# Patient Record
Sex: FEMALE | Race: WHITE | NOT HISPANIC OR LATINO | Employment: OTHER | URBAN - METROPOLITAN AREA
[De-identification: names, ages, dates, MRNs, and addresses within clinical notes are randomized per-mention and may not be internally consistent; named-entity substitution may affect disease eponyms.]

---

## 2017-01-09 ENCOUNTER — OFFICE VISIT (OUTPATIENT)
Dept: AUDIOLOGY | Facility: CLINIC | Age: 67
End: 2017-01-09

## 2017-01-09 DIAGNOSIS — H90.3 SENSORY HEARING LOSS, BILATERAL: Primary | ICD-10-CM

## 2017-01-09 NOTE — PROGRESS NOTES
AUDIOLOGY REPORT    BACKGROUND INFORMATION: Dr. Heena Hurst implanted Buffy Pruitt with a right  Nucleus  (serial #[id number]) cochlear implant (CI) on 10/5/16 (activation 11/1/16) due to normal hearing sloping to moderate to profound sensorineural hearing loss in the left ear and normal hearing sloping to moderately severe to profound sensorineural hearing loss in the right ear.  The patient is being seen for reprogramming of her right cochlear implant on 1/9/2017 in Audiology at the [facility].      PATIENT REPORT: Joanna has been using her right cochlear implant for about 2 months.  Overall, she had been doing well since the last visit until about 2 days ago when things seemed to sound more intense/tinny with the CI.  Patient also reports a constant sense of a low level noise with her CI on.  No health changes.  She takes only vitamins and began taking fish oil the day before onset.  She denied other changes.  She arrives wearing the SCAN program at the [facility].  She has been plugging the left ear and leaving the hearing aid out.  Patient plans to start using [employer] and Telephone with Confidence for listening practice.     FITTING SESSION: Dr. Heena Hurst, cochlear implant surgeon, ordered today's appointment. The patient came to the clinic for adjustment to the programs in the external speech processor and for assessment of the external components of the cochlear implant system. These components provide power and data to the internal device. Sound is only heard once the external portion is activated. Postoperative treatment, including device fitting and adjustment, audiologic assessments, and training are required at regular intervals. Testing can include electropsychophysical measures of threshold, comfort, and loudness balancing, which are completed to update the program.    Processor type: Two N6   Headpiece type: 900 series  Magnet  strength: #1 without irritation of skin.          TEST RESULTS:   Unaided hearing: Did not test today.  11/1/16 results: Left stable - normal hearing sloping to moderate to profound sensorineural hearing loss, Right - moderate to profound sensorineural hearing loss (stable except for 25-45 dB decrease from 250-750 Hz as expected following cochlear implantation).  Negative Imelda.  Patient was advised that she would be a candidate for using an acoustic component at 250 Hz and below.  Patient gave this much thought but declined trying it.  She reported she would like to get used to a fully electrical program in case hearing should continue to decline in the future.  She indicated she would not want to have to readjust to a change later.  Additionally, she does not want to have to manage the acoustic component or wear something in her ear.  She was happy with the sound quality in the electric only condition so declined even trying electric + acoustic.  I had ordered the acoustic component so I will hold it in the clinic in case she later decides she would like to try it.     Tympanograms: Did not test  Electrode Impedances: within normal limits.  Stable from last visit when electrodes 3-1 increased slightly.  They have not returned to baseline.  Will monitor.      Neural Response Testing: Did not test today since clear responses were found across the array at past visit.  Facial Stimulation: Absent  Tinnitus: Absent  Balance Problems: Absent  Pain/Discomfort: None reported  Strategies Tried:  Hz  Strategy Preference:  Hz    Programs:  1. (17) HOME: Measured comfort levels, ADRO + autosensitivity  2. (18) HOME: Comfort levels up 5 from program 1  3. (17) SCAN based on program #1  4. (17) CAFE with fixed directional, ADRO + autosensitivity, based on program #1    Number of Channels per Program: 22    COMMENTS: The left ear was plugged during programming of the right CI.  Patient was able to consistently  measure threshold (T) levels and scale loudness for comfort (C) levels with the right CI.  Reinstruction was provided and T levels decreased significantly.  C levels were fairly stable in the low to middle frequencies.  They decreased in the high frequencies.  Upon going live, the patient needed an overall increase of C levels of 2 units to achieve comfort.  She reported the quality significantly improved after the changes and the low level noise was no longer present.      SUMMARY AND RECOMMENDATIONS: Buffy Pruitt was seen for reprogramming of her right Cochlear [employer]  cochlear implant with N6 sound processors today.  T levels and high frequency C levels decreased today which resulted in improved quality.  Patient reports a sudden change in sound 2 days ago without a change in impedances so we will monitor. She will contact the clinic if there are any additional sudden changes before her 1/30/17 visit.  That day, we will complete her 3 month speech perception testing, impedance monitoring, programming if needed, tympanograms and unaided hearing testing.   She will notify the clinic if she would like to try the acoustic component on her processor in the future.  If things are going well based on the 3 month visit, we will consider resuming use of the left hearing aid after the next visit.  The patient will also contact the clinic with questions prior to the next visit.    The patient expressed understanding and agreement with this plan.      Miki Ly, CCC-A  Licensed Audiologist  [place] #[id number]

## 2017-01-30 ENCOUNTER — OFFICE VISIT (OUTPATIENT)
Dept: AUDIOLOGY | Facility: CLINIC | Age: 67
End: 2017-01-30

## 2017-01-30 ENCOUNTER — OFFICE VISIT (OUTPATIENT)
Dept: DERMATOLOGY | Facility: CLINIC | Age: 67
End: 2017-01-30

## 2017-01-30 DIAGNOSIS — L82.1 SEBORRHEIC KERATOSIS: ICD-10-CM

## 2017-01-30 DIAGNOSIS — H90.3 SENSORY HEARING LOSS, BILATERAL: Primary | ICD-10-CM

## 2017-01-30 DIAGNOSIS — D22.9 MULTIPLE BENIGN NEVI: ICD-10-CM

## 2017-01-30 DIAGNOSIS — Z12.83 SKIN CANCER SCREENING: Primary | ICD-10-CM

## 2017-01-30 RX ORDER — FEXOFENADINE HCL 180 MG/1
TABLET ORAL DAILY
COMMUNITY
Start: 2016-08-01

## 2017-01-30 ASSESSMENT — PAIN SCALES - GENERAL: PAINLEVEL: NO PAIN (0)

## 2017-01-30 NOTE — MR AVS SNAPSHOT
After Visit Summary   1/30/2017    Buffy Pruitt    MRN: [id number]           Patient Information     Date Of Birth          1950        Visit Information        Provider Department      1/30/2017 10:30 AM Carrie Vila AUD [facility] Audiology         Follow-ups after your visit        Your next 10 appointments already scheduled     Jan 30, 2017 12:30 PM   (Arrive by 12:15 PM)   New Patient Visit with TORY [facility] ([facility])    [address] [place] [id number] [phone]            Apr 06, 2017  9:00 AM   Cochlear Implant Return with FIDEL [facility] ([facility])    [address] [place] [id number] [phone]            May 01, 2017 10:30 AM   Cochlear Implant Return with FIDEL [facility]iology ([facility])    [address] [place] [id number] [phone]              Who to contact     Please call your clinic at [phone] to:    Ask questions about your health    Make or cancel appointments    Discuss your medicines    Learn about your test results    Speak to your doctor   If you have compliments or concerns about an experience at your clinic, or if you wish to file a complaint, please contact [facility] Patient Relations at [phone] or email us at [email or web address]         Additional Information About Your Visit        Orthoshart Information     [employer] gives you secure access to your electronic health record. If you see a primary care provider, you can also send messages to your care team and make appointments. If you have questions, please call your primary care clinic.  If you do not have a primary care provider, please call [phone] and they will assist you.      NanoICE is an electronic gateway  that provides easy, online access to your medical records. With Kran[employer], you can request a clinic appointment, read your test results, renew a prescription or communicate with your care team.     To access your existing account, please contact your [facility] or call [phone] for assistance.        Care EveryWhere ID     This is your Care EveryWhere ID. This could be used by other organizations to access your [place] medical records  [id number]         Blood Pressure from Last 3 Encounters:   10/05/16 100/59   05/21/14 101/64    Weight from Last 3 Encounters:   10/05/16 53.524 kg (118 lb)   07/19/16 51.71 kg (114 lb)   05/21/14 52.617 kg (116 lb)              Today, you had the following     No orders found for display       Primary Care Provider Office Phone # Fax #    Shakira Hess [phone]       [facility] [address] [facility] [address]        Thank you!     Thank you for choosing [facility] AUDIOLOGY  for your care. Our goal is always to provide you with excellent care. Hearing back from our patients is one way we can continue to improve our services. Please take a few minutes to complete the written survey that you may receive in the mail after your visit with us. Thank you!             Your Updated Medication List - Protect others around you: Learn how to safely use, store and throw away your medicines at www.disposemymeds.org.          This list is accurate as of: 1/30/17 11:52 AM.  Always use your most recent med list.                   Brand Name Dispense Instructions for use    calcium 600 MG tablet     180 tablet    Take 1 tablet by mouth 2 times daily       cefUROXime 250 MG tablet    CEFTIN    14 tablet    Take 1 tablet (250 mg) by mouth 2 times daily       HYDROcodone-acetaminophen 5-325 MG per tablet    NORCO    30 tablet    Take 1-2 tablets by mouth every 4 hours as needed for other (Moderate to Severe Pain)       magnesium 250 MG  tablet     30 tablet    Take 2 tablets by mouth daily       sertraline 50 MG tablet    ZOLOFT    90 tablet    Take 1 tablet (50 mg) by mouth daily       Vit C-Cholecalciferol-Oralia Hip 500-1000-20 MG-UNIT-MG Caps          VIT D-VIT E-SAFFLOWER OIL EX

## 2017-01-30 NOTE — NURSING NOTE
Dermatology Rooming Note    Buffy Pruitt's goals for this visit include:   Chief Complaint   Patient presents with     Skin Check     No spots of concern today. No personal or family hx of skin cancer.     Hayley Juarez, CMA
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
## 2017-01-30 NOTE — PROGRESS NOTES
"AUDIOLOGY REPORT    METHOD: Speech perception testing is conducted at regular intervals to determine the degree of benefit the patient is obtaining from the cochlear implant (CI). Tests are conducted using the cochlear implant without the benefit of lipreading. All tests are conducted in a sound-treated room. Perception of monosyllabic words and words in sentences are tested. Results usually show improvement over time. A decrease in performance indicates the need for re-programming of the prosthesis and/or replacement of components.     INTERVAL: 3 months     BACKGROUND: Buffy Pruitt was seen 1/30/2017 in Audiology at the [facility] [place] for right cochlear implant programming and 3 month testing.  Dr. Heena Hurst implanted Buffy Pruitt with a right  Nucleus  (serial #[id number]) cochlear implant (CI) on 10/5/16 (activation 11/1/16) due to normal hearing sloping to moderate to profound sensorineural hearing loss in the left ear and normal hearing sloping to moderately severe to profound sensorineural hearing loss in the right ear.      PATIENT REPORT: Joanna has been increasing her right cochlear implant volume to 9 or 10 so would like programming changes before today's 3 month testing.  She hears a \"whisper\" sound at the ends of high pitched speech sounds.  Patient reports she has not had any sudden changes in hearing with her CI since the one incident prior to the last clinic visit.  Patient has still been wearing the right CI alone and has not needed her left hearing aid.  She has been plugging the left ear.      FITTING SESSION: Dr. Heena Hurst, cochlear implant surgeon, ordered today's appointment. The patient came to the clinic for adjustment to the programs in the external speech processor and for assessment of the external components of the cochlear implant system. These components provide power and data to the internal device. Sound is only " heard once the external portion is activated. Postoperative treatment, including device fitting and adjustment, audiologic assessments, and training are required at regular intervals. Testing can include electropsychophysical measures of threshold, comfort, and loudness balancing, which are completed to update the program.    Processor type: Two N6 - microphone filters were changed prior to programming and testing  Headpiece type: 900 series  Magnet strength: #1 without irritation of skin.          TEST RESULTS:   Electrode Impedances: within normal limits.  Stable.   Will monitor.   Neural Response Testing: Did not test today since clear responses were found across the array at past visit.  Facial Stimulation: Absent  Tinnitus: Absent  Balance Problems: Absent  Pain/Discomfort: None reported  Strategies Tried:  Hz  Strategy Preference:  Hz    Programs:  1. (19) HOME: Measured comfort levels, ADRO + autosensitivity  2. (20) HOME: Comfort levels up 5 from program 1  3. (19) SCAN based on program #1  4. (19) CAFE with fixed directional, ADRO + autosensitivity, based on program #1    Number of Channels per Program: 22    COMMENTS: The left ear was plugged during programming of the right CI.  Patient was able to consistently measure threshold (T) levels and scale loudness for comfort (C) levels with the right CI.  T and C levels increased in the low to middle frequencies and decreased in the high frequencies.  Patient reported comfort after the changes.      Testing was completed following the programming changes.     TEST RESULTS:  35 minutes were spent assessing the patient s auditory rehabilitation status.  *NOTE: The left ear was plugged for right CI testing.     Device(s) used for Testing:   Right ear: [employer] N6 sound processor, Program 1 with middle volume, #1 magnet without irritation.  Filters changed before testing.   Left ear: Did not test - patient has not worn her hearing aid for the  last 3 months to allow adjustment to the contralateral CI    Soundfield Aided Thresholds with right CI: Detection in normal/borderline normal to mild loss range    Unaided Thresholds: Stable bilaterally - Left borderline normal sloping to moderate to profound loss, Right moderate to profound loss.  Known sensorineural loss bilaterally so bone conduction was not retested.  Patient has been advised that she would be a candidate for using an acoustic component at 250 Hz and below.  Patient gave this much thought but again declined trying it.  She reports that since she is doing so well with the CI alone that she doesn't want to have to readjust.  Additionally, she does not want to have to manage the acoustic component or wear something in her right ear.  She would instead like to wear the left hearing aid.   I had ordered the acoustic component so I will hold it in the clinic in case she later decides she would like to try it.   Tympanograms: Left normal, Right slightly shallow without otologic symptoms    CNC Words Test:  The patient repeats 25 single syllable words, auditory only. The words are presented at 60 dB A (conversational level) delivered from a CD player.    Preoperative Performance:  Left ear aided: 12%  Right ear aided: 8%  Bilaterally aided: 8%    3 months Post-Activation of CI (today):   Right CI: 60%  Bimodal: Did not test since patient has not been wearing the left hearing aid.  Will resume use after today's appointment.     [employer] Sentences Test:  The patient repeats 20 sentences, auditory only.  The sentences are presented at 60 dB A (conversational level) delivered from a CD player.     Preoperative Performance:  Left ear aided: 44% quiet, 20% +10 dB signal to noise ratio (SNR)  Right ear aided: 38% quiet, 11% +10 dB SNR  Bilaterally aided: 52% quiet, 23% +10 dB SNR    3 months Post-Activation of CI (today):   Right CI: 89% quiet, 69% +10 dB SNR  Bimodal: Did not test since patient has not been  wearing the left hearing aid.  Will resume use after today's appointment.    SUMMARY AND RECOMMENDATIONS: Joanna has been using her right Cochlear [employer] cochlear implant for 3 months.  Programming changes were made today since she has been increasing the CI volume.  Testing was subsequently completed.  Scores with the device are significantly higher than preoperative testing with hearing aids.  Her speech perception scores are above average with the cochlear implant. Patient will return in 6 weeks to follow up on today's adjustments and to see if additional changes are needed.  Impedances will be monitored.  She will also return in 3 months for her 6 month testing.  In the meantime, she will resume use of the left hearing aid so that bimodal testing can be completed at the 6 month assessment.  Patient is aware that when it is time to replace her current Audibel hearing aid in the future, that she may wish to consider a Resound hearing aid which would be compatible with the same wireless accessories as her N6 cochlear implant sound processor.  Patient again declined trying electroacoustic stimulation with the right device but will notify the clinic should she wish to try it in the future.      Residual hearing is stable in both ears.  Middle ear function is normal in the left ear.  The right tympanogram showed just slightly shallow movement.  Patient declined otologic concerns but will follow up with Dr. Hurst should any concerns arise.      The patient expressed understanding and agreement with this plan.      Miki Ly, CCC-A  Licensed Audiologist  [place] #[id number]     Heena Hurst MD

## 2017-04-06 ENCOUNTER — OFFICE VISIT (OUTPATIENT)
Dept: AUDIOLOGY | Facility: CLINIC | Age: 67
End: 2017-04-06

## 2017-04-06 DIAGNOSIS — H90.3 SENSORY HEARING LOSS, BILATERAL: Primary | ICD-10-CM

## 2017-04-06 NOTE — PROGRESS NOTES
AUDIOLOGY REPORT    BACKGROUND: Buffy Pruitt was seen 4/6/2017 in Audiology at the [facility] [place] for right cochlear implant programming.  Dr. 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      PATIENT REPORT: Joanna would like programming to see if the high pitched sounds can be decreased slightly in her cochlear implant.  She is wearing the home program #1 comfortable at the middle volume setting of 6.  She has recently started wearing the left hearing aid and has adjusted well.  She is considering getting a new hearing aid and will consider a Resound device since it would work with the wireless accessories she has for the cochlear implant.     FITTING SESSION: Dr. Heena Hurst, cochlear implant surgeon, ordered today's appointment. The patient came to the clinic for adjustment to the programs in the external speech processor and for assessment of the external components of the cochlear implant system. These components provide power and data to the internal device. Sound is only heard once the external portion is activated. Postoperative treatment, including device fitting and adjustment, audiologic assessments, and training are required at regular intervals. Testing can include electropsychophysical measures of threshold, comfort, and loudness balancing, which are completed to update the program.    Processor type: Two N6  Headpiece type: 900 series  Magnet strength: #1 without irritation of skin.          TEST RESULTS:   Electrode Impedances: within normal limits.  Stable.   Will monitor.   Neural Response Testing: Did not test today since clear responses were  found across the array at past visit.  Facial Stimulation: Absent  Tinnitus: Absent  Balance Problems: Absent  Pain/Discomfort: None reported  Strategies Tried:  Hz  Strategy Preference:  Hz    Programs:  1. (21) HOME: Measured comfort levels, ADRO + autosensitivity  2. (22) HOME: Comfort levels up 5 from program 1  3. (21) SCAN based on program #1  4. (21) CAFE with fixed directional, ADRO + autosensitivity, based on program #1    Number of Channels per Program: 22    COMMENTS: Stimulation levels were rechecked.  Thresholds were stable.  Comfort levels decreased in high frequencies.  Patient reported this improved the quality of speech and her own voice after the changes.  Her backup processor was also updated.       SUMMARY AND RECOMMENDATIONS: Joanna was seen for cochlear implant programming today, which resulted in improved speech quality.  She will return in 1 month for her 6 month testing of her CI and bimodal testing will be completed as well.  Patient is aware that when it is time to replace her current Audibel hearing aid in the future, that she may wish to consider a Resound hearing aid which would be compatible with the same wireless accessories as her N6 cochlear implant sound processor.  Patient has declined trying electroacoustic stimulation with the right device but will notify the clinic should she wish to try it in the future.      The patient expressed understanding and agreement with this plan.      Miki Ly, CCC-A  Licensed Audiologist  [place] #[id number]

## 2017-04-06 NOTE — MR AVS SNAPSHOT
After Visit Summary   4/6/2017    Buffy Pruitt    MRN: [id number]           Patient Information     Date Of Birth          1950        Visit Information        Provider Department      4/6/2017 9:00 AM Carrie Vila AuD M [facility] Audiology        Today's Diagnoses     Sensory hearing loss, bilateral    -  1       Follow-ups after your visit        Your next 10 appointments already scheduled     May 01, 2017 10:30 AM CDT   Cochlear Implant Return with Joel Chi [facility] Audiology ([facility] [place])    [address] [place] [address] [phone]              Who to contact     Please call your clinic at [phone] to:    Ask questions about your health    Make or cancel appointments    Discuss your medicines    Learn about your test results    Speak to your doctor   If you have compliments or concerns about an experience at your clinic, or if you wish to file a complaint, please contact [facility] Patient Relations at [phone] or email us at [email or web address]         Additional Information About Your Visit        MyChart Information     [employer] gives you secure access to your electronic health record. If you see a primary care provider, you can also send messages to your care team and make appointments. If you have questions, please call your primary care clinic.  If you do not have a primary care provider, please call [phone] and they will assist you.      [employer] is an electronic gateway that provides easy, online access to your medical records. With [employer], you can request a clinic appointment, read your test results, renew a prescription or communicate with your care team.     To access your existing account, please contact your [facility] or call [phone] for assistance.        Care EveryWhere ID     This is your Care EveryWhere ID. This could  be used by other organizations to access your [place] medical records  [id number]         Blood Pressure from Last 3 Encounters:   10/05/16 100/59   05/21/14 101/64    Weight from Last 3 Encounters:   10/05/16 53.5 kg (118 lb)   07/19/16 51.7 kg (114 lb)   05/21/14 52.6 kg (116 lb)              We Performed the Following     RT: Diagnostic Analysis of CI 7 yrs & over, Subsequent Programming   (43146)        Primary Care Provider Office Phone # Fax #    Shakira Hess [phone]       [facility] [address] [address]        Thank you!     Thank you for choosing [facility] AUDIOLOGY  for your care. Our goal is always to provide you with excellent care. Hearing back from our patients is one way we can continue to improve our services. Please take a few minutes to complete the written survey that you may receive in the mail after your visit with us. Thank you!             Your Updated Medication List - Protect others around you: Learn how to safely use, store and throw away your medicines at www.disposemymeds.org.          This list is accurate as of: 4/6/17  9:38 AM.  Always use your most recent med list.                   Brand Name Dispense Instructions for use    ALLEGRA ALLERGY 180 MG tablet   Generic drug:  fexofenadine      daily       Elderberry 575 MG/5ML Syrp      daily       magnesium 250 MG tablet     30 tablet    Take 2 tablets by mouth daily       Vit C-Cholecalciferol-Oralia Hip 500-1000-20 MG-UNIT-MG Caps          VIT D-VIT E-SAFFLOWER OIL EX

## 2017-05-01 ENCOUNTER — OFFICE VISIT (OUTPATIENT)
Dept: AUDIOLOGY | Facility: CLINIC | Age: 67
End: 2017-05-01

## 2017-05-01 DIAGNOSIS — H90.3 SENSORY HEARING LOSS, BILATERAL: Primary | ICD-10-CM

## 2017-05-01 NOTE — MR AVS SNAPSHOT
After Visit Summary   5/1/2017    Buffy Pruitt    MRN: [id number]           Patient Information     Date Of Birth          1950        Visit Information        Provider Department      5/1/2017 10:30 AM Carrie Vila AuD M [facility] Audiology        Today's Diagnoses     Sensory hearing loss, bilateral    -  1       Follow-ups after your visit        Follow-up notes from your care team     Return in about 6 weeks (around 6/12/2017), or CIR 90.      Your next 10 appointments already scheduled     Jun 22, 2017  9:30 AM CDT   Cochlear Implant Return with Joel Chi [facility] Audiology ([facility] [place])    [address] [facility] [address] [phone]              Who to contact     Please call your clinic at [phone] to:    Ask questions about your health    Make or cancel appointments    Discuss your medicines    Learn about your test results    Speak to your doctor   If you have compliments or concerns about an experience at your clinic, or if you wish to file a complaint, please contact [facility] Patient Relations at [phone] or email us at [email or web address]         Additional Information About Your Visit        MyChart Information     [employer] gives you secure access to your electronic health record. If you see a primary care provider, you can also send messages to your care team and make appointments. If you have questions, please call your primary care clinic.  If you do not have a primary care provider, please call [phone] and they will assist you.      "[employer] is an electronic gateway that provides easy, online access to your medical records. With "[employer] you can request a clinic appointment, read your test results, renew a prescription or communicate with your care team.     To access your existing account, please contact your [facility] or call  [phone] for assistance.        Care EveryWhere ID     This is your Care EveryWhere ID. This could be used by other organizations to access your [place] medical records  [id number]         Blood Pressure from Last 3 Encounters:   10/05/16 100/59   05/21/14 101/64    Weight from Last 3 Encounters:   10/05/16 53.5 kg (118 lb)   07/19/16 51.7 kg (114 lb)   05/21/14 52.6 kg (116 lb)              We Performed the Following     RT: Diagnostic Analysis of CI 7 yrs & over, Subsequent Programming   (21019)        Primary Care Provider Office Phone # Fax #    Shakira Hess [phone]       [facility] [address] [address]        Thank you!     Thank you for choosing [facility] AUDIOLOGY  for your care. Our goal is always to provide you with excellent care. Hearing back from our patients is one way we can continue to improve our services. Please take a few minutes to complete the written survey that you may receive in the mail after your visit with us. Thank you!             Your Updated Medication List - Protect others around you: Learn how to safely use, store and throw away your medicines at www.disposemymeds.org.          This list is accurate as of: 5/1/17 11:40 AM.  Always use your most recent med list.                   Brand Name Dispense Instructions for use    ALLEGRA ALLERGY 180 MG tablet   Generic drug:  fexofenadine      daily       Elderberry 575 MG/5ML Syrp      daily       magnesium 250 MG tablet     30 tablet    Take 2 tablets by mouth daily       Vit C-Cholecalciferol-Oralia Hip 500-1000-20 MG-UNIT-MG Caps          VIT D-VIT E-SAFFLOWER OIL EX

## 2017-05-01 NOTE — PROGRESS NOTES
AUDIOLOGY REPORT    BACKGROUND: Buffy Pruitt was seen 5/1/2017 in Audiology at the [facility] [place] for right cochlear implant programming.  Dr. Heena Hurst implanted Buffy Pruitt with a right  Nucleus  (serial #[id number]) cochlear implant (CI) on 10/5/16 (activation 11/1/16) due to normal hearing sloping to moderate to profound sensorineural hearing loss in the left ear and normal hearing sloping to moderately severe to profound sensorineural hearing loss in the right ear.      PATIENT REPORT: Joanna is here for programming to address issues with clarity and volume. She reports that things are not as clear as they were with previous programs, and she now finds some soft sounds to be too intense (e.g., setting coffee cup on the counter in the other room). She is wearing her right CI and left hearing aid consistently now and wonders if the sound of the hearing aid is interfering with how she hears with the CI. She is wearing program #2 at the middle volume setting 6.  She would like to postpone her 6 month speech perception testing until after she finds a more comfortable program.        FITTING SESSION: Dr. Heena Hurst, cochlear implant surgeon, ordered today's appointment. The patient came to the clinic for adjustment to the programs in the external speech processor and for assessment of the external components of the cochlear implant system. These components provide power and data to the internal device. Sound is only heard once the external portion is activated. Postoperative treatment, including device fitting and adjustment, audiologic assessments, and training are required at regular intervals. Testing can include electropsychophysical measures of threshold, comfort, and loudness balancing, which are completed to update the program.    Processor type: Two N6  Headpiece type: 900 series  Magnet strength: #1 without irritation of skin.           TEST RESULTS:   Electrode Impedances: Stable and within normal limits  Neural Response Testing: Did not test today since clear responses were found across the array at past visit.  Facial Stimulation: Absent  Tinnitus: Absent  Balance Problems: Absent  Pain/Discomfort: None reported  Strategies Tried:  Hz  Strategy Preference:  Hz    Programs:  1. (20) HOME: Measured comfort levels, ADRO + autosensitivity  2. (24) HOME: Comfort levels up 5 from program 1  3. (20) SCAN based on program #1  4. (20) CAFE with fixed directional, ADRO + autosensitivity, based on program #1    Number of Channels per Program: 22    COMMENTS: Two previous programs (18 and 20) and most recent Home program (22) were downloaded to the processor and Joanna was able to trial the programs in clinic and around the building today. She reported program 20 to be the most comfortable with the best sound quality and clarity. She was given one louder program (+5 units from program 1) as well as access to the sensitivity control to try when needed. Her backup processor was also updated.       SUMMARY AND RECOMMENDATIONS: Joanna was seen for cochlear implant programming today (modified charges since new programs were not created), which resulted in improved speech quality and comfort after returning to an old program.  She was also given sensitivity access today and shown how to use it appropropriately.  She will return in June for speech perception testing with her CI and bimodal testing will be completed as well. She will e-mail with any questions or concerns in the meantime.    The patient expressed understanding and agreement with this plan.    Ailyn Connors M.A.  Audiology Extern  Temporary License #[id number]     I was present with the patient for the entire Audiology appointment including all procedures/testing performed by the AuD student, and agree with the student s assessment and plan as documented.    Miki Ly,  CCC-A  Licensed Audiologist  [place] #[id number]

## 2017-06-22 ENCOUNTER — OFFICE VISIT (OUTPATIENT)
Dept: AUDIOLOGY | Facility: CLINIC | Age: 67
End: 2017-06-22

## 2017-06-22 DIAGNOSIS — H90.3 SENSORY HEARING LOSS, BILATERAL: Primary | ICD-10-CM

## 2017-06-22 NOTE — PROGRESS NOTES
"AUDIOLOGY REPORT    METHOD: Speech perception testing is conducted at regular intervals to determine the degree of benefit the patient is obtaining from the cochlear implant (CI). Tests are conducted using the cochlear implant without the benefit of lipreading. All tests are conducted in a sound-treated room. Perception of monosyllabic words and words in sentences are tested. Results usually show improvement over time. A decrease in performance indicates the need for re-programming of the prosthesis and/or replacement of components.     INTERVAL: 7 1/2 months (6 month testing was postponed due to programming needs)    BACKGROUND: Buffy Pruitt was seen 6/22/2017 in Audiology at the [facility] [place] for right cochlear implant programming and speech perception testing.  Dr. Heena Hurst implanted Buffy Pruitt with a right  Nucleus  (serial #[id number]) cochlear implant (CI) on 10/5/16 (activation 11/1/16) due to normal hearing sloping to moderate to profound sensorineural hearing loss in the left ear and normal hearing sloping to moderately severe to profound sensorineural hearing loss in the right ear.      PATIENT REPORT: Joanna reports she feels the old program which she has been using since May has been an improvement.  She would still like to see if additional adjustments can be made to reduce the high frequency sounds, which she perceives as a \"chirp\".  She has been wearing her contralateral hearing aid.  She is not interested in using EAS with her CI since she is understanding well and doesn't want to readjust to something new.      FITTING SESSION: Dr. Heena Hurst, cochlear implant surgeon, ordered today's appointment. The patient came to the clinic for adjustment to the programs in the external speech processor and for assessment of the external components of the cochlear implant system. These components provide power and data to the internal " device. Sound is only heard once the external portion is activated. Postoperative treatment, including device fitting and adjustment, audiologic assessments, and training are required at regular intervals. Testing can include electropsychophysical measures of threshold, comfort, and loudness balancing, which are completed to update the program.    Processor type: Two N6 - microphone filters were changed prior to programming and testing  Headpiece type: 900 series  Magnet strength: #1 without irritation of skin.          TEST RESULTS:   Dataloggin hours of use per day  Electrode Impedances: within normal limits.  Increase on basal electrodes.  Will monitor to determine if this was related to clogged microphone filters.  Impedances have fluctuated slightly in the past.    Neural Response Testing: Did not test today since clear responses were found across the array at past visit.  Facial Stimulation: Absent  Tinnitus: Absent  Balance Problems: Absent  Pain/Discomfort: None reported  Strategies Tried:  Hz  Strategy Preference:  Hz    Programs:  1. (26) HOME: Measured comfort levels, ADRO + autosensitivity  2. (27) HOME: Comfort levels up 5 from program 1  3. (26) SCAN based on program #1  4. (26) CAFE with fixed directional, ADRO + autosensitivity, based on program #1    Number of Channels per Program: 22    COMMENTS: Initially threshold (T) and comfort (C) levels were reduced 5 units on the basal electrodes.  Patient did not like this change as she felt speech wasn't as clear.  Therefore, T and C levels were returned to previous setting.  Gains were then tapered down in high frequencies.  Patient reported this significantly improved sound quality and she was happy with the changes.    Testing was completed following the programming changes.     TEST RESULTS:  40 minutes were spent assessing the patient s auditory rehabilitation status.  *NOTE: The left ear was plugged for right CI testing.      Device(s) used for Testing:   Right ear: Cochlear Stremor N6 sound processor, Program 1 with middle volume, #1 magnet without irritation.  Filters changed before testing.   Left ear: SHERLYN hearing aid    Soundfield Aided Thresholds with right CI: Detection in normal to mild loss range - stable    Unaided Thresholds: Left ear was not retested since it was tested in January 2017 and patient denies changes.  Stable right moderate to profound loss.  Known sensorineural loss bilaterally so bone conduction was not retested.  Patient has been advised that she would be a candidate for using an acoustic component at 250 Hz and below.  Patient gave this much thought but again declined trying it.  She reports that since she is doing so well with the CI alone that she doesn't want to have to readjust.  Additionally, she does not want to have to manage the acoustic component or wear something in her right ear.  She would instead like to wear the left hearing aid.   I had ordered the acoustic component so I will hold it in the clinic in case she later decides she would like to try it.     Tympanograms: Left normal, Right slightly shallow without otologic symptoms - similar to past    CNC Words Test:  The patient repeats 25 single syllable words, auditory only. The words are presented at 60 dB A (conversational level) delivered from a CD player.    Preoperative Performance:  Left ear aided: 12%  Right ear aided: 8%  Bilaterally aided: 8%    3 months Post-Activation of CI:   Right CI: 60%  Bimodal: Did not test since patient has not been wearing the left hearing aid.  Will resume use after today's appointment.     7 1/2 months Post-Activation of CI (today):   Right CI: 72%  Bimodal: 80%    [employer] Sentences Test:  The patient repeats 20 sentences, auditory only.  The sentences are presented at 60 dB A (conversational level) delivered from a CD player.     Preoperative Performance:  Left ear aided: 44% quiet, 20% +10 dB signal to  noise ratio (SNR)  Right ear aided: 38% quiet, 11% +10 dB SNR  Bilaterally aided: 52% quiet, 23% +10 dB SNR    3 months Post-Activation of CI:   Right CI: 89% quiet, 69% +10 dB SNR  Bimodal: Did not test since patient has not been wearing the left hearing aid.  Will resume use after today's appointment.    7 1/2 months Post-Activation of CI (today):   Right CI: 91% quiet, 65% +10 dB SNR  Bimodal: Did not test in quiet, 78% with +10 dB SNR       SUMMARY AND RECOMMENDATIONS: Joanna has been using her right Cochlear [employer] cochlear implant for 7 1/2 months.  Programming changes (gain decrease in highs) were made today due to her quality complaints.  Impedances were noted to rise on basal electrodes and will be monitored (may be related to clogged microphone filters).  Testing was subsequently completed.  Scores with the device are significantly higher than preoperative testing with hearing aids.  Her speech perception scores are above average with the cochlear implant. Compared to last testing with the CI, CNC scores have improved.  There is a bimodal advantage.  Patient will return in November 2017 for 1 year testing, impedance monitoring, magnet recheck and bilateral unaided hearing testing.  Patient again declined trying electroacoustic stimulation with the right device but will notify the clinic should she wish to try it in the future.  Patient will return sooner than the November 2017 appointment if programming changes are needed.      Residual hearing is stable in the right ear.  Middle ear function is normal in the left ear.  The right tympanogram showed slightly shallow movement, as in the past.  Patient declined otologic concerns but will follow up with Dr. Hurst should any concerns arise.      The patient expressed understanding and agreement with this plan.      Miki Ly, CCC-A  Licensed Audiologist  [place] #[id number]

## 2017-06-22 NOTE — MR AVS SNAPSHOT
After Visit Summary   6/22/2017    Buffy Pruitt    MRN: [id number]           Patient Information     Date Of Birth          1950        Visit Information        Provider Department      6/22/2017 9:30 AM Carrie Vila AuD M [facility] Audiology         Follow-ups after your visit        Your next 10 appointments already scheduled     Nov 08, 2017  9:30 AM CST   Cochlear Implant Return with Joel Chi [facility] Audiology ([facility] [place])    [address] [place] [address] [phone]              Who to contact     Please call your clinic at [phone] to:    Ask questions about your health    Make or cancel appointments    Discuss your medicines    Learn about your test results    Speak to your doctor   If you have compliments or concerns about an experience at your clinic, or if you wish to file a complaint, please contact [facility] Patient Relations at [phone] or email us at [email or web address]         Additional Information About Your Visit        MyChart Information     Welcome Real-timet gives you secure access to your electronic health record. If you see a primary care provider, you can also send messages to your care team and make appointments. If you have questions, please call your primary care clinic.  If you do not have a primary care provider, please call [phone] and they will assist you.      NanoPotential is an electronic gateway that provides easy, online access to your medical records. With NanoPotential, you can request a clinic appointment, read your test results, renew a prescription or communicate with your care team.     To access your existing account, please contact your [facility] or call [phone] for assistance.        Care EveryWhere ID     This is your Care EveryWhere ID. This could be used by other organizations to access your [facility]  records  [id number]         Blood Pressure from Last 3 Encounters:   10/05/16 100/59   05/21/14 101/64    Weight from Last 3 Encounters:   10/05/16 53.5 kg (118 lb)   07/19/16 51.7 kg (114 lb)   05/21/14 52.6 kg (116 lb)              Today, you had the following     No orders found for display       Primary Care Provider Office Phone # Fax #    Shakira Hess [phone]       [facility] [address] [facility] [address]        Equal Access to Services     [facility]: Hadii aad ku hadasho Soomaali, waaxda luqadaha, qaybta kaalmada adeegyada, ilda up haylaurita adedanae najera . So [facility] [phone].    ATENCIÓN: Si habla español, tiene a branham disposición servicios gratuitos de asistencia lingüística. [facility] [phone].    We comply with applicable federal civil rights laws and [place] laws. We do not discriminate on the basis of race, color, national origin, age, disability sex, sexual orientation or gender identity.            Thank you!     Thank you for choosing [facility] AUDIOLOGY  for your care. Our goal is always to provide you with excellent care. Hearing back from our patients is one way we can continue to improve our services. Please take a few minutes to complete the written survey that you may receive in the mail after your visit with us. Thank you!             Your Updated Medication List - Protect others around you: Learn how to safely use, store and throw away your medicines at www.disposemymeds.org.          This list is accurate as of: 6/22/17 10:17 AM.  Always use your most recent med list.                   Brand Name Dispense Instructions for use Diagnosis    ALLEGRA ALLERGY 180 MG tablet   Generic drug:  fexofenadine      daily        Elderberry 575 MG/5ML Syrp      daily        magnesium 250 MG tablet     30 tablet    Take 2 tablets by mouth daily        Vit C-Cholecalciferol-Oralia Hip 500-1000-20 MG-UNIT-MG Caps           VIT D-VIT E-SAFFLOWER OIL EX

## 2017-09-20 ENCOUNTER — TELEPHONE (OUTPATIENT)
Dept: AUDIOLOGY | Facility: CLINIC | Age: 67
End: 2017-09-20

## 2017-09-20 NOTE — TELEPHONE ENCOUNTER
"Received call that patient's cochlear implant remote isn't working.  Advised them to hit \"reset\" button.  This resolved issue.  Patient will re-pair the processor to the remote.  Provided [employer] # for future troubleshooting needs.      Miki Ly, CCC-A  Licensed Audiologist  [place] #[id number]    "

## 2018-01-16 DIAGNOSIS — Z96.21 COCHLEAR IMPLANT IN PLACE: Primary | ICD-10-CM

## 2018-01-22 ENCOUNTER — TELEPHONE (OUTPATIENT)
Dept: GASTROENTEROLOGY | Facility: CLINIC | Age: 68
End: 2018-01-22

## 2018-01-22 NOTE — TELEPHONE ENCOUNTER
calling stating that patient has been experiencing some more stomach symptoms. Since her last procedure she had a cochlear implant and she did not have the MRI as recommended. She reports that she has been having lower abdominal pain which started in June 2017 She saw her PCP and then the symptoms went away. She reports a pressure, stinging sensation which seems to radiate all over and comes and goes.  She has tried to make an appointment Dr. Ackerman but can't until March, 2018. They are planning a trip and would she would like to be seen prior to March, 2018.    Clinic number provided to patient and will let Dr. Aleman know.    Emmy Joyce RN

## 2018-01-25 ENCOUNTER — OFFICE VISIT (OUTPATIENT)
Dept: AUDIOLOGY | Facility: CLINIC | Age: 68
End: 2018-01-25
Payer: COMMERCIAL

## 2018-01-25 DIAGNOSIS — H90.3 SENSORY HEARING LOSS, BILATERAL: Primary | ICD-10-CM

## 2018-01-25 NOTE — MR AVS SNAPSHOT
After Visit Summary   1/25/2018    Buffy Pruitt    MRN: [id number]           Patient Information     Date Of Birth          1950        Visit Information        Provider Department      1/25/2018 9:30 AM Carrie Vila AuD M [facility] Audiology        Today's Diagnoses     Sensory hearing loss, bilateral    -  1       Follow-ups after your visit        Follow-up notes from your care team     Return in about 6 months (around 7/25/2018) for CIR 90.      Your next 10 appointments already scheduled     Mar 21, 2018 11:00 AM CDT   (Arrive by 10:45 AM)   Return Visit with TORY Ospina [facility] Dermatology ([facility] [place])    [address] [facility] [address]  [address] [facility] [address] [phone]            Jul 26, 2018  9:30 AM CDT   Cochlear Implant Return with Joel Chi [facility] Audiology ([facility] [place])    [address]  [address] [facility] [address] [phone]              Who to contact     Please call your clinic at [phone] to:    Ask questions about your health    Make or cancel appointments    Discuss your medicines    Learn about your test results    Speak to your doctor   If you have compliments or concerns about an experience at your clinic, or if you wish to file a complaint, please contact [facility] Patient Relations at [phone] or email us at [email or web address]         Additional Information About Your Visit        MyChart Information     Lama Labt gives you secure access to your electronic health record. If you see a primary care provider, you can also send messages to your care team and make appointments. If you have questions, please call your primary care clinic.  If you do not have a primary care provider, please call [phone] and they will assist you.      PiPsports is an electronic gateway that provides easy, online access to your medical  records. With StraighterLine, you can request a clinic appointment, read your test results, renew a prescription or communicate with your care team.     To access your existing account, please contact your [facility] Clinic or call [phone] for assistance.        Care EveryWhere ID     This is your Care EveryWhere ID. This could be used by other organizations to access your [place] medical records  [id number]         Blood Pressure from Last 3 Encounters:   10/05/16 100/59   05/21/14 101/64    Weight from Last 3 Encounters:   10/05/16 53.5 kg (118 lb)   07/19/16 51.7 kg (114 lb)   05/21/14 52.6 kg (116 lb)              We Performed the Following     AUDIOGRAM/TYMPANOGRAM - INTERFACE     Audiometry/Air   (38879)     Eval of Aud Rehab Status (60 min)   (64477)     Reduced 52 - Tympanometry (impedance - testing)   (31369)     RT: Diagnostic Analysis of CI 7 yrs & over, Subsequent Programming   (97904)        Primary Care Provider Office Phone # Fax #    Shakira Hess [phone]       [facility] [address] [facility] [address]        Equal Access to Services     [facility]: Hadii aad ku hadasho Soomaali, waaxda luqadaha, qaybta kaalmada adeegyada, waxay idiin haymilann debbie leong laanibal baer. So [facility] [phone].    ATENCIÓN: Si habla español, tiene a branham disposición servicios gratuitos de asistencia lingüística. [facility] [phone].    We comply with applicable federal civil rights laws and [place] laws. We do not discriminate on the basis of race, color, national origin, age, disability, sex, sexual orientation, or gender identity.            Thank you!     Thank you for choosing [facility] AUDIOLOGY  for your care. Our goal is always to provide you with excellent care. Hearing back from our patients is one way we can continue to improve our services. Please take a few minutes to complete the written survey that you may receive in the mail after your visit with  us. Thank you!             Your Updated Medication List - Protect others around you: Learn how to safely use, store and throw away your medicines at www.disposemymeds.org.          This list is accurate as of 1/25/18 11:51 AM.  Always use your most recent med list.                   Brand Name Dispense Instructions for use Diagnosis    ALLEGRA ALLERGY 180 MG tablet   Generic drug:  fexofenadine      daily        Elderberry 575 MG/5ML Syrp      daily        magnesium 250 MG tablet     30 tablet    Take 2 tablets by mouth daily        Vit C-Cholecalciferol-Oralia Hip 500-1000-20 MG-UNIT-MG Caps           VIT D-VIT E-SAFFLOWER OIL EX

## 2018-01-25 NOTE — PROGRESS NOTES
AUDIOLOGY REPORT    METHOD: Speech perception testing is conducted at regular intervals to determine the degree of benefit the patient is obtaining from the cochlear implant (CI). Tests are conducted using the cochlear implant without the benefit of lipreading. All tests are conducted in a sound-treated room. Perception of monosyllabic words and words in sentences are tested. Results usually show improvement over time. A decrease in performance indicates the need for re-programming of the prosthesis and/or replacement of components.     INTERVAL: 1 year & 3 month testing (1 year appointment delayed due to patient rescheduling.)    BACKGROUND: Buffy Pruitt was seen 1/25/2018 in Audiology at the [facility] [place] [facility] [place] for right cochlear implant testing.  Dr. Heena Hurst implanted Buffy Pruitt with a right  Nucleus  (serial #[id number]) cochlear implant (CI) on 10/5/16 (activation 11/1/16) due to normal hearing sloping to moderate to profound sensorineural hearing loss in the left ear and normal hearing sloping to moderately severe to profound sensorineural hearing loss in the right ear.      PATIENT REPORT: Joanna reports she is pleased with the right CI programming changes made in June 2017 and she is doing well.  She denies any ear pain.  She has noticed a gradual decrease in residual hearing in the right ear over the last 6 months but does not feel it has affected her hearing with her cochlear implant.  She has been wearing her contralateral hearing aid and reports she has an upcoming appointment at her hearing aid clinic in [place] for a left hearing test and will be discussing possibly getting a new left hearing aid.  She was advised to consider a Resound aid since it may be compatible with her phone clip and mini erica from her CI.      Patient reports she is being assessed for stomach pain and will be having CT scan tomorrow.  She originally thought she  would need an MRI but recommendations changed once her physician realized she had a cochlear implant.  Patient is aware that should an MRI be advised in the future, that her physician should consult with Dr. Hurst due to the CI magnet.        Processor type: Two N6 - Microphone filters were not changed prior to programming and testing since patient changed them at home one month ago.  Changed to large earhook today to improve retention over front of ear at patient request.  This was only done in primary processor or the time being.    Headpiece type: 900 series  Magnet strength: #1 without irritation of skin. Retention seems to be getting tighter over time so patient was sent home with two #1/2 magnets in case she needs to decrease strength over time.  She will monitor.             TEST RESULTS:  40 minutes were spent assessing the patient s auditory rehabilitation status.    *NOTE: The left ear was plugged for right CI testing.     Device(s) used for Testing:   Right ear: Cochlear [employer] N6 sound processor, SCAN with middle volume, #1 magnet     Left ear: SHERLYN hearing aid    Soundfield Aided Thresholds with right CI: Detection in normal to borderline normal range - stable    Unaided Thresholds: Left ear was not retested at patient request since she will be having this done at her hearing aid clinic in [place] soon.  Right ear - profound loss (no response to bone in past suggesting sensorineural loss so this was not retested today).  Re: 6/22/17 the right ear decreased 35 dB at 250 Hz and 10 dB at 500 Hz.  Patient is no longer candidate for EAS which she declined trying in the past.      Tympanograms: Left normal (difficulty getting seal and had to hold probe, resulting in artifact), Right no seal after numerous attempts and after trying multiple probe sizes    CNC Words Test:  The patient repeats 25 single syllable words, auditory only. The words are presented at 60 dB A (conversational level) delivered from a CD  player.    Preoperative Performance:  Left ear aided: 12%  Right ear aided: 8%  Bilaterally aided: 8%    3 months Post-Activation of CI:   Right CI: 60%  Bimodal: Did not test since patient has not been wearing the left hearing aid.  Will resume use after today's appointment.     7 1/2 months Post-Activation of CI:   Right CI: 72%  Bimodal: 80%    1 year, 3 months Post-Activation of CI (today):   Right CI: 76%  Bimodal: 80%      AzBio Sentences Test:  The patient repeats 20 sentences, auditory only.  The sentences are presented at 60 dB A (conversational level) delivered from a CD player.     Preoperative Performance:  Left ear aided: 44% quiet, 20% +10 dB signal to noise ratio (SNR)  Right ear aided: 38% quiet, 11% +10 dB SNR  Bilaterally aided: 52% quiet, 23% +10 dB SNR    3 months Post-Activation of CI:   Right CI: 89% quiet, 69% +10 dB SNR  Bimodal: Did not test since patient has not been wearing the left hearing aid.  Will resume use after today's appointment.    7 1/2 months Post-Activation of CI:   Right CI: 91% quiet, 65% +10 dB SNR  Bimodal: Did not test in quiet, 78% with +10 dB SNR     1 years, 3 months Post-Activation of CI (today):   Right CI: 90% quiet, 70% +10 dB SNR  Bimodal: Did not test in quiet, 85% with +10 dB SNR     Cochlear implant impedances were rechecked since they have fluctuated in past and because of a rise on basal electrodes in June 2017.  Today they anant on most apical electrodes 22-11.  Compliance was checked in the programs and was maintained.  Based on this and the stable hearing with the CI, no programming changes were made.  Therefore, charges were modified.  Results were sent to [employer] for recommendations based on impedance change and residual hearing change on right side.      SUMMARY AND RECOMMENDATIONS: Joanna has been using her right [employer] cochlear implant for 1 year, 3 months.  1. Speech perception scores with the right CI are above average and are stable.   There is a bimodal advantage in noise.   2. Residual hearing has declined in the right/implanted ear.  3. Cochlear implant impedances have risen on electrodes 22-11 but compliance was maintained in the programs and there have been no hearing changes with the CI.  4. Cochlear will review these findings and will make recommendations.  5. Patient to monitor magnet strength and decrease to 1/2 if needed.    6. Patient prefers to return to hearing aid provider in [place] for left hearing test and discussion of possible new left hearing aid.  Patient with consider Resound options that may be compatible with Cochlear accessories.  She has a phone clip and mini erica 2+.   7. Middle ear function is normal in the left ear and could not be tested in implant ear (no seal).  Patient denies ear pain or otologic symptoms/concerns.  She will continue to follow up with Dr. Hurst as needed for concerns about her ears or cochlear implants.  Dr. Hurst will be consulted with regarding today's findings.     8. Patient advised to return in 6 months (rather than 1 year) for monitoring of impedances, speech perception and residual hearing including 125 Hz or sooner if changes.  The right tympanogram will be reattempted at that time and magnet strength will be reassessed.  If she likes the large earhook, one can be placed on her backup equipment at that time as well.    9. Patient will be contacted should recommendations change after review by Dr. Hurst and Alley.      The patient expressed understanding and agreement with this plan.      Miki Ly, CCC-A  Licensed Audiologist  [place] #[id number]    Enclosure: audiogram    Cc Heena Hurst MD        ADDENDUM: [facility] reviewed today's findings.  They indicated that when patients lose residual hearing after cochlear implantation that impedances tend to fluctuate for some time after that.  Therefore, their recommendation would be monitoring of impedances and speech perception as  planned.  They do not feel any medical follow up is needed at this time.  This information was passed along to Dr. Hurst' clinic in case she has other recommendations.    Miki Ly, CCC-A  Licensed Audiologist  [place] #[id number]

## 2018-01-31 ENCOUNTER — TELEPHONE (OUTPATIENT)
Dept: AUDIOLOGY | Facility: CLINIC | Age: 68
End: 2018-01-31

## 2018-01-31 NOTE — TELEPHONE ENCOUNTER
Updated patient via email after hearing back from Dr. Hurst' clinic today.  Cochlear Jon and Dr. Hurst reviewed findings from last week's cochlear implant visit.  They agree with plan to monitor patient in 6 months in Audiology.  No need to see Dr. Hurst at this time.      Miki Ly, CCC-A  Licensed Audiologist  [place] #[id number]

## 2018-03-21 ENCOUNTER — OFFICE VISIT (OUTPATIENT)
Dept: DERMATOLOGY | Facility: CLINIC | Age: 68
End: 2018-03-21
Payer: COMMERCIAL

## 2018-03-21 DIAGNOSIS — L82.0 INFLAMED SEBORRHEIC KERATOSIS: Primary | ICD-10-CM

## 2018-03-21 DIAGNOSIS — L81.4 SOLAR LENTIGINOSIS: ICD-10-CM

## 2018-03-21 DIAGNOSIS — D23.72 DERMATOFIBROMA OF LEFT LOWER EXTREMITY: ICD-10-CM

## 2018-03-21 DIAGNOSIS — D22.9 MULTIPLE BENIGN NEVI: ICD-10-CM

## 2018-03-21 DIAGNOSIS — Z12.83 SKIN CANCER SCREENING: ICD-10-CM

## 2018-03-21 DIAGNOSIS — L82.1 SEBORRHEIC KERATOSIS: ICD-10-CM

## 2018-03-21 ASSESSMENT — ACTIVITIES OF DAILY LIVING (ADL)
AMBULATION: 0 - INDEPENDENT
AMBULATION: 0-->INDEPENDENT
TRANSFERRING: 0-->INDEPENDENT
EATING: 0 - INDEPENDENT
RETIRED_COMMUNICATION: 0-->UNDERSTANDS/COMMUNICATES WITHOUT DIFFICULTY
TOILETING: 0 - INDEPENDENT
DRESS: 0 - INDEPENDENT
TOILETING: 0-->INDEPENDENT
BATHING: 0 - INDEPENDENT
COMMUNICATION: 0 - UNDERSTANDS/COMMUNICATES WITHOUT DIFFICULTY
COGNITION: 0 - NO COGNITION ISSUES REPORTED
DRESS: 0-->INDEPENDENT
FALL_HISTORY_WITHIN_LAST_SIX_MONTHS: NO
CHANGE_IN_FUNCTIONAL_STATUS_SINCE_ONSET_OF_CURRENT_ILLNESS/INJURY: NO
RETIRED_EATING: 0-->INDEPENDENT
TRANSFERRING: 0 - INDEPENDENT
BATHING: 0-->INDEPENDENT

## 2018-03-21 ASSESSMENT — PAIN SCALES - GENERAL
PAINLEVEL: MILD PAIN (2)
PAINLEVEL: NO PAIN (0)
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
## 2018-03-21 NOTE — NURSING NOTE
Dermatology Rooming Note    Buffy Pruitt's goals for this visit include:   Chief Complaint   Patient presents with     Derm Problem     Buffy is visiting for a yearly skin check. She has an area on her shoulder.     Deandra Narvaez

## 2018-03-21 NOTE — MR AVS SNAPSHOT
After Visit Summary   3/21/2018    Buffy Puritt    MRN: [id number]           Patient Information     Date Of Birth          1950        Visit Information        Provider Department      3/21/2018 11:00 AM Stacie Winston PA-C [facility] Dermatology        Today's Diagnoses     Inflamed seborrheic keratosis    -  1    Skin cancer screening        Multiple benign nevi        Seborrheic keratosis        Solar lentiginosis        Dermatofibroma of left lower extremity          Care Instructions    Cryotherapy    What is it?    Use of a very cold liquid, such as liquid nitrogen, to freeze and destroy abnormal skin cells that need to be removed    What should I expect?    Tenderness and redness    A small blister that might grow and fill with dark purple blood. There may be crusting.    More than one treatment may be needed if the lesions do not go away.    How do I care for the treated area?    Gently wash the area with your hands when bathing.    Use a thin layer of Vaseline to help with healing. You may use a Band-Aid.     The area should heal within 7-10 days and may leave behind a pink or lighter color.     Do not use an antibiotic or Neosporin ointment.     You may take acetaminophen (Tylenol) for pain.     Call your Doctor if you have:    Severe pain    Signs of infection (warmth, redness, cloudy yellow drainage, and or a bad smell)    Questions or concerns    Who should I call with questions?       [facility]: [phone]       [facility]: [phone]       For urgent needs outside of business hours call the [facility] at [phone]        and ask for the dermatology resident on call          Follow-ups after your visit        Follow-up notes from your care team     Return in about 1 year (around 3/21/2019).      Your next 10 appointments already scheduled     Jul 26, 2018  9:30 AM CDT   Cochlear Implant Return  with Joel Chi [facility] Audiology ([facility])    [address] [address] [phone]            Jan 31, 2019  9:30 AM CST   Cochlear Implant Return with Joel Chi [facility] Audiology ([facility])    [address] [id number] [phone]              Who to contact     Please call your clinic at [phone] to:    Ask questions about your health    Make or cancel appointments    Discuss your medicines    Learn about your test results    Speak to your doctor            Additional Information About Your Visit        [employer] Information     [employer] gives you secure access to your electronic health record. If you see a primary care provider, you can also send messages to your care team and make appointments. If you have questions, please call your primary care clinic.  If you do not have a primary care provider, please call [phone] and they will assist you.      [employer] is an electronic gateway that provides easy, online access to your medical records. With [employer], you can request a clinic appointment, read your test results, renew a prescription or communicate with your care team.     To access your existing account, please contact your [facility] or call [phone] for assistance.        Care EveryWhere ID     This is your Care EveryWhere ID. This could be used by other organizations to access your [place] medical records  [id number]         Blood Pressure from Last 3 Encounters:   10/05/16 100/59   05/21/14 101/64    Weight from Last 3 Encounters:   10/05/16 53.5 kg (118 lb)   07/19/16 51.7 kg (114 lb)   05/21/14 52.6 kg (116 lb)              We Performed the Following     DESTRUCT BENIGN LESION, UP TO 14        Primary Care Provider Office Phone # Fax #    Shakira Hess [phone]       [facility] [address]  [facility] [address]        Equal Access to Services     STEFFANY DOBSON : Hadii aad ku hadjaradkarin Nicolaali, wagriceldada luqadaha, qayelenata yanetjosé antoniodelmis ruiz, ilda cervantesmichaelye baer. So [facility] [phone].    ATENCIÓN: Si habla español, tiene a branham disposición servicios gratuitos de asistencia lingüística. [facility] [phone].    We comply with applicable federal civil rights laws and [place] laws. We do not discriminate on the basis of race, color, national origin, age, disability, sex, sexual orientation, or gender identity.            Thank you!     Thank you for choosing [facility] DERMATOLOGY  for your care. Our goal is always to provide you with excellent care. Hearing back from our patients is one way we can continue to improve our services. Please take a few minutes to complete the written survey that you may receive in the mail after your visit with us. Thank you!             Your Updated Medication List - Protect others around you: Learn how to safely use, store and throw away your medicines at www.disposemymeds.org.          This list is accurate as of 3/21/18 11:11 AM.  Always use your most recent med list.                   Brand Name Dispense Instructions for use Diagnosis    ALLEGRA ALLERGY 180 MG tablet   Generic drug:  fexofenadine      daily        Elderberry 575 MG/5ML Syrp      daily        magnesium 250 MG tablet     30 tablet    Take 2 tablets by mouth daily        Vit C-Cholecalciferol-Oralia Hip 500-1000-20 MG-UNIT-MG Caps           VIT D-VIT E-SAFFLOWER OIL EX

## 2018-03-21 NOTE — PATIENT INSTRUCTIONS
Cryotherapy    What is it?    Use of a very cold liquid, such as liquid nitrogen, to freeze and destroy abnormal skin cells that need to be removed    What should I expect?    Tenderness and redness    A small blister that might grow and fill with dark purple blood. There may be crusting.    More than one treatment may be needed if the lesions do not go away.    How do I care for the treated area?    Gently wash the area with your hands when bathing.    Use a thin layer of Vaseline to help with healing. You may use a Band-Aid.     The area should heal within 7-10 days and may leave behind a pink or lighter color.     Do not use an antibiotic or Neosporin ointment.     You may take acetaminophen (Tylenol) for pain.     Call your Doctor if you have:    Severe pain    Signs of infection (warmth, redness, cloudy yellow drainage, and or a bad smell)    Questions or concerns    Who should I call with questions?       [facility]: [phone]       [facility]: [phone]       For urgent needs outside of business hours call the [facility] at [phone]        and ask for the dermatology resident on call
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
## 2018-07-26 ENCOUNTER — OFFICE VISIT (OUTPATIENT)
Dept: AUDIOLOGY | Facility: CLINIC | Age: 68
End: 2018-07-26
Payer: COMMERCIAL

## 2018-07-26 DIAGNOSIS — H90.3 SENSORY HEARING LOSS, BILATERAL: Primary | ICD-10-CM

## 2018-07-26 NOTE — MR AVS SNAPSHOT
After Visit Summary   7/26/2018    Buffy Pruitt    MRN: [id number]           Patient Information     Date Of Birth          1950        Visit Information        Provider Department      7/26/2018 9:30 AM Carrie Vila AuD M [facility] Audiology        Today's Diagnoses     Sensory hearing loss, bilateral    -  1       Follow-ups after your visit        Your next 10 appointments already scheduled     Jan 31, 2019  9:30 AM CST   Cochlear Implant Return with Joel Chi [facility] Audiology ([facility])    [address] [place] [address] [phone]              Who to contact     Please call your clinic at [phone] to:    Ask questions about your health    Make or cancel appointments    Discuss your medicines    Learn about your test results    Speak to your doctor            Additional Information About Your Visit        MyChart Information     boarding pass gives you secure access to your electronic health record. If you see a primary care provider, you can also send messages to your care team and make appointments. If you have questions, please call your primary care clinic.  If you do not have a primary care provider, please call [phone] and they will assist you.      boarding pass is an electronic gateway that provides easy, online access to your medical records. With boarding pass, you can request a clinic appointment, read your test results, renew a prescription or communicate with your care team.     To access your existing account, please contact your [facility] or call [phone] for assistance.        Care EveryWhere ID     This is your Care EveryWhere ID. This could be used by other organizations to access your [place] medical records  [id number]         Blood Pressure from Last 3 Encounters:   10/05/16 100/59   05/21/14 101/64    Weight from Last 3 Encounters:   10/05/16 53.5 kg (118 lb)    07/19/16 51.7 kg (114 lb)   05/21/14 52.6 kg (116 lb)              We Performed the Following     AUDIOGRAM/TYMPANOGRAM - INTERFACE     Audiometry/Air   (34921)     Eval of Aud Rehab Status (60 min)   (10957)     RT: Diagnostic Analysis of CI 7 yrs & over, Subsequent Programming   (28256)     Tympanometry (impedance - testing) (28344)        Primary Care Provider Office Phone # Fax #    Shakira Hess [phone]       [facility] [address] [facility] [address]        Equal Access to Services     [facility] AH: Hadii aad ku hadasho Soomaali, waaxda luqadaha, qaybta kaalmada adeegyada, ilda up hayaan debbie najera . So [facility] [phone].    ATENCIÓN: Si habla español, tiene a branham disposición servicios gratuitos de asistencia lingüística. [facility] [phone].    We comply with applicable federal civil rights laws and [place] laws. We do not discriminate on the basis of race, color, national origin, age, disability, sex, sexual orientation, or gender identity.            Thank you!     Thank you for choosing [facility] AUDIOLOGY  for your care. Our goal is always to provide you with excellent care. Hearing back from our patients is one way we can continue to improve our services. Please take a few minutes to complete the written survey that you may receive in the mail after your visit with us. Thank you!             Your Updated Medication List - Protect others around you: Learn how to safely use, store and throw away your medicines at www.disposemymeds.org.          This list is accurate as of 7/26/18  3:34 PM.  Always use your most recent med list.                   Brand Name Dispense Instructions for use Diagnosis    ALLEGRA ALLERGY 180 MG tablet   Generic drug:  fexofenadine      daily        Elderberry 575 MG/5ML Syrp      daily        magnesium 250 MG tablet     30 tablet    Take 2 tablets by mouth daily        Vit C-Cholecalciferol-Oralia Hip 500-1000-20 MG-UNIT-MG Caps            VIT D-VIT E-SAFFLOWER OIL EX

## 2018-07-26 NOTE — PROGRESS NOTES
AUDIOLOGY REPORT    BACKGROUND: Buffy Pruitt was seen 7/26/2018 in Audiology at the [facility] for right cochlear implant programming, impedance monitoring and speech perception testing.  Dr. Heena Hurst implanted Buffy Pruitt with a right  Nucleus  (serial #[id number]) cochlear implant (CI) on 10/5/16 (activation 11/1/16) due to normal hearing sloping to moderate to profound sensorineural hearing loss in the left ear and normal hearing sloping to moderately severe to profound sensorineural hearing loss in the right ear.      PATIENT REPORT: Joanna reports that since she lost low frequency hearing in the implanted ear she has been getting used to the change in sound quality.  She would like some programming changes to see if the high pitched sound can be reduced.  She is otherwise doing well.  She has been wearing her contralateral hearing aid and reports that she did not end up getting a new hearing aid yet as her provider retired.  She is looking for another provider near her.  She may consider [facility] in [place] since they offer both hearing aid and cochlear implant services.  She was advised to consider a Resound aid since it may be compatible with her phone clip and mini erica from her CI.      FITTING SESSION: Dr. Heena Hurst, cochlear implant surgeon, ordered today's appointment. The patient came to the clinic for adjustment to the programs in the external speech processor and for assessment of the external components of the cochlear implant system. These components provide power and data to the internal device. Sound is only heard once the external portion is activated. Postoperative treatment, including device fitting and adjustment, audiologic assessments, and training are required at regular intervals. Testing can include electropsychophysical measures of threshold, comfort, and loudness balancing, which are completed to update the  program.    Processor type: Two N6 - Microphone filters were changed prior to programming and testing.    Headpiece type: 900 series  Magnet strength: #1 without irritation of skin. She returned the #1/2 magnets today as they do not retain well.            TEST RESULTS:   Dataloggin.7 hours of use per day  Electrode Impedances: within normal limits.  Monitoring due to past fluctuations which Cochlear indicates are likely caused by the loss of residual hearing.  Impedances continue to fluctuate today but programs are within compliance.      Neural Response Testing: Did not test today since clear responses were found across the array at past visit.  Facial Stimulation: Absent  Tinnitus: Absent  Balance Problems: Absent  Pain/Discomfort: None reported  Strategies Tried:  Hz  Strategy Preference:  Hz    Programs:  1. (29) HOME: Measured comfort levels, ADRO + autosensitivity (new)  2. (27) HOME: Old preferred program (old)  3. (29) SCAN based on program #1 (new)   4. (29) CAFE with fixed directional, ADRO + autosensitivity, based on program #1 (new)    Number of Channels per Program: 22    COMMENTS: Comfort (C) levels were significantly reshaped. Of note the high frequencies decreased and the patient reported this resulted in improved sound quality.  The old preferred program was left in program #2 in case she has difficulty adjusting to the changes.  The backup processor was updated.    NOTE: Patient hears a buzz when presenting live speech while connected to programming software but not while on battery.  Cochlear reports this is caused by the difference in power levels between the software and patient's own program.         METHOD: Speech perception testing is conducted at regular intervals to determine the degree of benefit the patient is obtaining from the cochlear implant (CI). Tests are conducted using the cochlear implant without the benefit of lipreading. All tests are conducted in a  sound-treated room. Perception of monosyllabic words and words in sentences are tested. Results usually show improvement over time. A decrease in performance indicates the need for re-programming of the prosthesis and/or replacement of components.     INTERVAL: 1 year & 9 month testing            TEST RESULTS:  40 minutes were spent assessing the patient s auditory rehabilitation status.    *NOTE: The left ear was plugged for right CI testing.     Device(s) used for Testing:   Right ear: Cochlear Sagge N6 sound processor, SCAN with middle volume, #1 magnet.  Patient is still experimenting with different earhook sizes to determine her preferences.      Left ear: SHERLYN hearing aid - did not test bimodal condition today due to patient fatigue and testing at last visit    Soundfield Aided Thresholds with right CI: Detection in normal to mild loss range - stable    Unaided Thresholds: Left ear was not retested at patient request since she will be having this done at a clinic in [place] soon.  Right ear - profound loss (no response to bone in past suggesting sensorineural loss so this was not retested today).  Re: 1/25/18 the right ear decreased at 250 Hz and there is no longer measurable hearing.      Tympanograms: Left normal, Right shallow without otologic symptoms as in the past    CNC Words Test:  The patient repeats 25 single syllable words, auditory only. The words are presented at 60 dB A (conversational level) delivered from a CD player.    Preoperative Performance:  Left ear aided: 12%  Right ear aided: 8%  Bilaterally aided: 8%    3 months Post-Activation of CI:   Right CI: 60%  Bimodal: Did not test since patient has not been wearing the left hearing aid.  Will resume use after today's appointment.     7 1/2 months Post-Activation of CI:   Right CI: 72%  Bimodal: 80%    1 year, 3 months Post-Activation of CI:   Right CI: 76%  Bimodal: 80%    1 year, 9 months Post-Activation of CI (today):   Right CI:  76%  Bimodal: Did not test due to patient fatigue and since tested at last visit.  Patient expects to get new hearing aid soon.        AzBio Sentences Test:  The patient repeats 20 sentences, auditory only.  The sentences are presented at 60 dB A (conversational level) delivered from a CD player.     Preoperative Performance:  Left ear aided: 44% quiet, 20% +10 dB signal to noise ratio (SNR)  Right ear aided: 38% quiet, 11% +10 dB SNR  Bilaterally aided: 52% quiet, 23% +10 dB SNR    3 months Post-Activation of CI:   Right CI: 89% quiet, 69% +10 dB SNR  Bimodal: Did not test since patient has not been wearing the left hearing aid.  Will resume use after today's appointment.    7 1/2 months Post-Activation of CI:   Right CI: 91% quiet, 65% +10 dB SNR  Bimodal: Did not test in quiet, 78% with +10 dB SNR     1 years, 3 months Post-Activation of CI:   Right CI: 90% quiet, 70% +10 dB SNR  Bimodal: Did not test in quiet, 85% with +10 dB SNR     1 years, 9 months Post-Activation of CI (today):   Right CI: 91% quiet, 75% +10 dB SNR  Bimodal: Did not test due to patient fatigue and since tested at last visit.  Patient expects to get new hearing aid soon.           SUMMARY AND RECOMMENDATIONS: Joanna has been using her right Cochlear [employer] cochlear implant for 1 year, 9 months.  1. Speech perception scores with the right CI are above average and are stable.    2. Residual hearing has declined in the right/implanted ear and is no longer measurable.  3. Cochlear implant impedances continue to fluctuate and Cochlear reports this may be caused by the change in residual hearing.  Speech perception scores and impedances will be rechecked in 6 months.    4. Programming changes were made today based on sound quality complaints.  Patient will return sooner than 6 months if additional changes are needed.   5. Patient to establish care with hearing aid provider in [place] for left hearing test and discussion of possible new left  hearing aid.  Patient with consider Resound options that may be compatible with Cochlear accessories.  She has a phone clip and mini erica 2+.   6. Middle ear function is normal in the left ear. Tympanogram is shallow in the right ear without otologic symptoms as in the past.  She will continue to follow up with Dr. Hurst as needed for concerns about her ears or cochlear implants.       The patient expressed understanding and agreement with this plan.      Miki Ly, CCC-A  Licensed Audiologist  [place] #    Enclosure: audiogram    Cc Heena Hurst MD

## 2018-08-08 ENCOUNTER — TELEPHONE (OUTPATIENT)
Dept: AUDIOLOGY | Facility: CLINIC | Age: 68
End: 2018-08-08

## 2018-08-08 NOTE — TELEPHONE ENCOUNTER
Emailed the patient the following information about her phone clip issues:    Tino Marshall,  I heard back from Cochlear today on recommendations for your phone clip issue where people can t hear you.  Here are some things to try:    1- Trying adjusting the volume of your phone to see if that helps.  (You likely already tried this)  2- Make sure the operating system of your phone is up to date.  3- Have your phone  forget  the phone clip and then re-pair them.    If none of this works, I could have you try out a different demo phone clip in clinic.  I asked if we could exchange yours but since it has been out of warranty since 10/31/17 they would not make an exception.    Please keep me posted.    Thanks!    Miki Ly, CCC-A, [facility]  Doctor of Audiology     [facility]  Audiology Clinic  4th Floor, Mail [address]  [address] [place] [address]  [email or web address]  [phone]  Appointments: [phone]  Fax: [phone]        Will await response from patient.    Miki Ly, CCC-A  Licensed Audiologist  [place] #[id number]

## 2019-01-23 DIAGNOSIS — Z96.21 COCHLEAR IMPLANT STATUS: Primary | ICD-10-CM

## 2019-01-31 ENCOUNTER — OFFICE VISIT (OUTPATIENT)
Dept: AUDIOLOGY | Facility: CLINIC | Age: 69
End: 2019-01-31
Payer: COMMERCIAL

## 2019-01-31 DIAGNOSIS — Z96.21 COCHLEAR IMPLANT STATUS: ICD-10-CM

## 2019-01-31 DIAGNOSIS — H90.3 SENSORY HEARING LOSS, BILATERAL: Primary | ICD-10-CM

## 2019-01-31 NOTE — PROGRESS NOTES
AUDIOLOGY REPORT    BACKGROUND: Buffy Pruitt was seen 2019 in Audiology at the [facility] [place] for right cochlear implant impedance monitoring and speech perception testing.  Dr. Heena Hurst implanted Buffy Pruitt with a right  Nucleus  (serial #[id number]) cochlear implant (CI) on 10/5/16 (activation 16) due to normal hearing sloping to moderate to profound sensorineural hearing loss in the left ear and normal hearing sloping to moderately severe to profound sensorineural hearing loss in the right ear.        Dr. Heena Hurst, cochlear implant surgeon, ordered today's appointment.     PATIENT REPORT:   1- Patient is building a new house and hasn't been able to prioritize her hearing.  Therefore, she has not yet proceeded with a left hearing test and new left hearing aid order in [place].  2- Patient has been wearing old program #2 in her right CI and has forgotten to try new program #1 with less high frequencies.  3- Patient may need an MRI in one year to monitor a spot on her pancreas.  She will have her managing physician consult with Dr. Hurst to discuss possible CI magnet removal if MRI is needed.  4- Patient is wondering if she can use NuFace (skincare product) and Sonicare toothbrush, both of which use microcurrent.  These questions will be forwarded to Dr. Hurst and her nurse.  Patient reports she contacted Cochlear first but they indicated the questions should be answered by Dr. Hurst.         TEST RESULTS:   Dataloggin.9 hours of use per day  Electrode Impedances: within normal limits.  Monitoring due to past fluctuations which Cochlear indicates are likely caused by the loss of residual hearing.  Impedances continue to fluctuate today but programs are within compliance.  Since a full reprogramming session was not completed, programming charges were modified.     METHOD: Speech perception testing is conducted at regular  intervals to determine the degree of benefit the patient is obtaining from the cochlear implant (CI). Tests are conducted using the cochlear implant without the benefit of lipreading. All tests are conducted in a sound-treated room. Perception of monosyllabic words and words in sentences are tested. Results usually show improvement over time. A decrease in performance indicates the need for re-programming of the prosthesis and/or replacement of components.     INTERVAL: 2 years & 3 month testing            TEST RESULTS:  40 minutes were spent assessing the patient s auditory rehabilitation status.    *NOTE: The left ear was plugged for right CI testing.     Device(s) used for Testing:   Right ear: Cochlear XDC N6 sound processor, Program #2 HOME with middle volume, #1 magnet without irritation or discomfort.  Patient changed microphone filters at home within last few weeks.   Left ear: SHERLYN hearing aid     Soundfield Aided Thresholds with right CI: Detection in borderline normal to mild loss range - stable    Unaided Thresholds: Left ear was not retested at patient request since she will be having this done at a clinic in [place] soon.  Right ear did not test since no measurable hearing on 7/26/18 (profound sensorineural hearing loss).      Tympanograms: Left normal, Right slightly shallow without otologic symptoms as in the past  Difficult to maintain seal due to sharp bend in canals so need to hold probe    CNC Words Test:  The patient repeats 25 single syllable words, auditory only. The words are presented at 60 dB A (conversational level) delivered from a CD player.    Preoperative Performance:  Left ear aided: 12%  Right ear aided: 8%  Bilaterally aided: 8%    3 months Post-Activation of CI:   Right CI: 60%  Bimodal: Did not test since patient has not been wearing the left hearing aid.  Will resume use after today's appointment.     7 1/2 months Post-Activation of CI:   Right CI: 72%  Bimodal: 80%    1  year, 3 months Post-Activation of CI:   Right CI: 76%  Bimodal: 80%    1 year, 9 months Post-Activation of CI:   Right CI: 76%  Bimodal: Did not test due to patient fatigue and since tested at last visit.  Patient expects to get new hearing aid soon.      2 years, 3 months Post-Activation of CI (today):   Right CI: 76%  Bimodal: 88%      AzBio Sentences Test:  The patient repeats 20 sentences, auditory only.  The sentences are presented at 60 dB A (conversational level) delivered from a CD player.     Preoperative Performance:  Left ear aided: 44% quiet, 20% +10 dB signal to noise ratio (SNR)  Right ear aided: 38% quiet, 11% +10 dB SNR  Bilaterally aided: 52% quiet, 23% +10 dB SNR    3 months Post-Activation of CI:   Right CI: 89% quiet, 69% +10 dB SNR  Bimodal: Did not test since patient has not been wearing the left hearing aid.  Will resume use after today's appointment.    7 1/2 months Post-Activation of CI:   Right CI: 91% quiet, 65% +10 dB SNR  Bimodal: Did not test in quiet, 78% with +10 dB SNR     1 years, 3 months Post-Activation of CI:   Right CI: 90% quiet, 70% +10 dB SNR  Bimodal: Did not test in quiet, 85% with +10 dB SNR     1 years, 9 months Post-Activation of CI:   Right CI: 91% quiet, 75% +10 dB SNR  Bimodal: Did not test due to patient fatigue and since tested at last visit.  Patient expects to get new hearing aid soon.       2 years, 3 months Post-Activation of CI (today):   Right CI: 94% quiet, 81% +10 dB SNR  Bimodal: Did not test in quiet; 78% with +10 dB SNR      SUMMARY AND RECOMMENDATIONS: Joanna has been using her right Cochlear [employer] cochlear implant for 2 years, 3 months.  1. Speech perception scores with the right CI are above average and are stable.    2. Cochlear implant impedances continue to fluctuate and Cochlear reports this may be caused by the change in residual hearing.  Programs remain within compliance.   3. Speech perception scores and impedances will be rechecked in  November 2019 (3 years post-activation).    4. Patient forgot to try the new programs created in July 2018 but will try those prior to the November 2019 to see if the high frequency reduction helps sound quality.  Patient will return sooner than November 2019 if additional programming changes are needed.   5. Patient to establish care with hearing aid provider in [place] for left hearing test and discussion of possible new left hearing aid.  Patient with consider Resound options that may be compatible with Cochlear accessories.  She has a phone clip and mini erica 2+.   6. Middle ear function is normal in the left ear. Tympanogram is slightly shallow in the right ear without otologic symptoms as in the past.  She will continue to follow up with Dr. Hurst as needed for concerns about her ears or cochlear implants.    7. Patient's questions about Sonicare, NuFace and MRI were sent to Dr. Hurst and her nurse and they will follow up with the patient.         The patient expressed understanding and agreement with this plan.      Miki Ly, CCC-A  Licensed Audiologist  [place] #[id number]    Enclosure: audiogram    Cc Heena Hurst MD

## 2019-02-03 ENCOUNTER — TELEPHONE (OUTPATIENT)
Dept: AUDIOLOGY | Facility: CLINIC | Age: 69
End: 2019-02-03

## 2019-02-03 NOTE — TELEPHONE ENCOUNTER
Emailed patient with updates after Dr. Hurst reviewed her questions posed during the 1/31/19 Audiology visit:    1. Dr. Hurst said that she does not know enough about NuFace to make any recommendation. She said Cochlear or the NuFace company will have to determine if this is safe for use with cochlear implant.     2. Sonicare toothbrush is safe for use with cochlear implant.     3. Dr Hurst is happy to discuss to MRI options with the physician ordering pancreas MRI if one is needed in the future. Patient can provide our clinic's phone # to referring physician.       Miki Ly, CCC-A  Licensed Audiologist  [place] #[id number]

## 2019-05-29 ENCOUNTER — OFFICE VISIT (OUTPATIENT)
Dept: DERMATOLOGY | Facility: CLINIC | Age: 69
End: 2019-05-29
Payer: COMMERCIAL

## 2019-05-29 DIAGNOSIS — L82.1 SEBORRHEIC KERATOSIS: ICD-10-CM

## 2019-05-29 DIAGNOSIS — L81.4 SOLAR LENTIGO: ICD-10-CM

## 2019-05-29 DIAGNOSIS — D22.9 MULTIPLE BENIGN NEVI: ICD-10-CM

## 2019-05-29 DIAGNOSIS — D23.9 DERMATOFIBROMA: ICD-10-CM

## 2019-05-29 DIAGNOSIS — L82.0 INFLAMED SEBORRHEIC KERATOSIS: ICD-10-CM

## 2019-05-29 DIAGNOSIS — Z12.83 SKIN CANCER SCREENING: Primary | ICD-10-CM

## 2019-05-29 ASSESSMENT — PAIN SCALES - GENERAL: PAINLEVEL: NO PAIN (0)
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
## 2019-05-29 NOTE — PATIENT INSTRUCTIONS
Cryotherapy    What is it?    Use of a very cold liquid, such as liquid nitrogen, to freeze and destroy abnormal skin cells that need to be removed    What should I expect?    Tenderness and redness    A small blister that might grow and fill with dark purple blood. There may be crusting.    More than one treatment may be needed if the lesions do not go away.    How do I care for the treated area?    Gently wash the area with your hands when bathing.    Use a thin layer of Vaseline to help with healing. You may use a Band-Aid.     The area should heal within 7-10 days and may leave behind a pink or lighter color.     Do not use an antibiotic or Neosporin ointment.     You may take acetaminophen (Tylenol) for pain.     Call your Doctor if you have:    Severe pain    Signs of infection (warmth, redness, cloudy yellow drainage, and or a bad smell)    Questions or concerns    Who should I call with questions?       [facility]: [phone]       [facility]: [phone]       For urgent needs outside of business hours call the [facility] at [phone]        and ask for the dermatology resident on call
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
## 2019-05-29 NOTE — NURSING NOTE
Dermatology Rooming Note    Buffy Pruitt's goals for this visit include:   Chief Complaint   Patient presents with     Skin Check     Joanna is here for skin check      Heather Arias, CMA

## 2019-11-13 ENCOUNTER — OFFICE VISIT (OUTPATIENT)
Dept: AUDIOLOGY | Facility: CLINIC | Age: 69
End: 2019-11-13
Payer: COMMERCIAL

## 2019-11-13 DIAGNOSIS — H90.3 SENSORY HEARING LOSS, BILATERAL: Primary | ICD-10-CM

## 2019-11-13 NOTE — PROGRESS NOTES
AUDIOLOGY REPORT    BACKGROUND: Buffy Pruitt was seen 11/13/2019 in Audiology at the [facility] [place] for right cochlear implant impedance monitoring and speech perception testing.  Dr. Heena Hurst implanted Buffy Pruitt with a right  Nucleus  (serial #[id number]) cochlear implant (CI) on 10/5/16 (activation 11/1/16) due to normal hearing sloping to moderate to profound sensorineural hearing loss in the left ear and normal hearing sloping to moderately severe to profound sensorineural hearing loss in the right ear.      Dr. Heena Hurst, cochlear implant surgeon, ordered today's appointment.     PATIENT REPORT:   1- Patient received a new left Resound SHERLYN in [place] less than 1 week ago.  At this time, she doesn't want it paired with her mini erica 2+ or phone clip accessories.  We discussed how to do this, should she wish to do it later.    2- Patient has tried program #1 with less high frequencies, and feels it has less clarity.  She has been primarily using program 2.  She would like to keep program 1 in case it is needed, but would like the SCAN and Cafe programs changed to be based on program 2, rather than program 1.    3- Discussed use of sensitivity as needed.       TEST RESULTS:   Electrode Impedances: within normal limits and stable.  Monitoring due to past fluctuations which Cochlear indicates are likely caused by the loss of residual hearing.    Programming changes were made to make SCAN and Cafe programs based on map #27, rather than #29.    Programs:  1. (29) HOME: Less high frequencies, ADRO + autosensitivity   2. (27) HOME: Old preferred program, ADRO + autosensitivity  3. (27) SCAN based on program #2 (new)   4. (27) CAFE with fixed directional, ADRO + autosensitivity, based on program #2 (new)      METHOD: Speech perception testing is conducted at regular intervals to determine the degree of benefit the patient is obtaining from the  cochlear implant (CI). Tests are conducted using the cochlear implant without the benefit of lipreading. All tests are conducted in a sound-treated room. Perception of monosyllabic words and words in sentences are tested. Results usually show improvement over time. A decrease in performance indicates the need for re-programming of the prosthesis and/or replacement of components.     INTERVAL: 3 years           TEST RESULTS:  40 minutes were spent assessing the patient s auditory rehabilitation status.    *NOTE: The left ear was plugged for right CI testing.     Device(s) used for Testing:   Right ear: Cochlear Hexago N6 sound processor, Program #2 HOME with middle volume, #1 magnet without irritation or discomfort.  Microphone filters changed before testing.   Left ear: Resound SHERLYN    Soundfield Aided Thresholds with right CI: Detection in borderline normal range - stable    Unaided Thresholds: Left ear was not retested since patient reports this was recently done at her clinic in [place].  Right ear did not test since no measurable hearing on 7/26/18 (profound sensorineural hearing loss).      Tympanograms: Left normal, Right no seal - sharp bend in canal, no otologic symptoms     CNC Words Test:  The patient repeats 25 single syllable words, auditory only. The words are presented at 60 dB A (conversational level) delivered from a CD player.    Preoperative Performance:  Left ear aided: 12%  Right ear aided: 8%  Bilaterally aided: 8%    3 months Post-Activation of CI:   Right CI: 60%  Bimodal: Did not test since patient has not been wearing the left hearing aid.  Will resume use after today's appointment.     7 1/2 months Post-Activation of CI:   Right CI: 72%  Bimodal: 80%    1 year, 3 months Post-Activation of CI:   Right CI: 76%  Bimodal: 80%    1 year, 9 months Post-Activation of CI:   Right CI: 76%  Bimodal: Did not test due to patient fatigue and since tested at last visit.  Patient expects to get new  hearing aid soon.      2 years, 3 months Post-Activation of CI:   Right CI: 76%  Bimodal: 88%    3  Post-Activation of CI (today):   Right CI: 84%  Bimodal: 84%      AzBio Sentences Test:  The patient repeats 20 sentences, auditory only.  The sentences are presented at 60 dB A (conversational level) delivered from a CD player.     Preoperative Performance:  Left ear aided: 44% quiet, 20% +10 dB signal to noise ratio (SNR)  Right ear aided: 38% quiet, 11% +10 dB SNR  Bilaterally aided: 52% quiet, 23% +10 dB SNR    3 months Post-Activation of CI:   Right CI: 89% quiet, 69% +10 dB SNR  Bimodal: Did not test since patient has not been wearing the left hearing aid.  Will resume use after today's appointment.    7 1/2 months Post-Activation of CI:   Right CI: 91% quiet, 65% +10 dB SNR  Bimodal: Did not test in quiet, 78% with +10 dB SNR     1 years, 3 months Post-Activation of CI:   Right CI: 90% quiet, 70% +10 dB SNR  Bimodal: Did not test in quiet, 85% with +10 dB SNR     1 years, 9 months Post-Activation of CI:   Right CI: 91% quiet, 75% +10 dB SNR  Bimodal: Did not test due to patient fatigue and since tested at last visit.  Patient expects to get new hearing aid soon.       2 years, 3 months Post-Activation of CI:   Right CI: 94% quiet, 81% +10 dB SNR  Bimodal: Did not test in quiet; 78% with +10 dB SNR    3 years Post-Activation of CI (today):   Right CI: 93% quiet, 76% +10 dB SNR  Bimodal: Did not test in quiet; 76% with +10 dB SNR    SUMMARY AND RECOMMENDATIONS: Joanna has been using her right [employer] cochlear implant for 3 years.  1. Speech perception scores with the right CI are above average and are stable. There is not a significant bimodal advantage but she has only had the new left hearing aid for less than a week.  She is aware of how to pair her accessories bimodally, should she decide to do so (she declined today).       2. Cochlear implant impedances, which have fluctuated in the past, were  stable today.  Programming changes were made to make SCAN and Cafe programs based on preferred map #27.    3. Patient should return for monitoring of speech perception scores and impedances in 1 year.    4. Middle ear function is normal in the left ear. Tympanogram could not successfully be recorded in the right ear.  No otologic symptoms as in the past.  She will continue to follow up with Dr. Hrust as needed for concerns about her ears or cochlear implant.      The patient expressed understanding and agreement with this plan.      Miki Ly, CCC-A, [facility]  Licensed Audiologist  [place] #[id number]    Enclosure: audiogram    Cc Heena Hurst MD

## 2020-03-02 ENCOUNTER — HEALTH MAINTENANCE LETTER (OUTPATIENT)
Age: 70
End: 2020-03-02

## 2020-07-06 ENCOUNTER — TELEPHONE (OUTPATIENT)
Dept: AUDIOLOGY | Facility: CLINIC | Age: 70
End: 2020-07-06

## 2020-07-06 ENCOUNTER — MEDICAL CORRESPONDENCE (OUTPATIENT)
Dept: HEALTH INFORMATION MANAGEMENT | Facility: CLINIC | Age: 70
End: 2020-07-06

## 2020-07-06 NOTE — TELEPHONE ENCOUNTER
Letter emailed to patient stating that jury duty may be challenging situation for her to hear in, given her use of a right cochlear implant and left hearing aid.      Miki Ly, CCC-A, [facility]  Licensed Audiologist  [place] #[id number]

## 2020-10-29 ENCOUNTER — TELEPHONE (OUTPATIENT)
Dept: AUDIOLOGY | Facility: CLINIC | Age: 70
End: 2020-10-29

## 2020-10-29 NOTE — TELEPHONE ENCOUNTER
Received email from patient.  She is doing well with her cochlear implant and has no current concerns.  Due to COVID-19 risk, she would like to cancel her annual visit in November.  11/18/2020 visit will be canceled.      Miki Ly, CCC-A, [facility]  Licensed Audiologist  [place] #[id number]

## 2020-12-20 ENCOUNTER — HEALTH MAINTENANCE LETTER (OUTPATIENT)
Age: 70
End: 2020-12-20

## 2021-04-15 ENCOUNTER — TELEPHONE (OUTPATIENT)
Dept: AUDIOLOGY | Facility: CLINIC | Age: 71
End: 2021-04-15

## 2021-04-15 NOTE — TELEPHONE ENCOUNTER
Email communication with patient:      Our ENT nurse Raysa Zhong checked with Dr. Kinney, one of the CI surgeons, and she said you can undergo this.      Miki Ly, CCC-A, [facility]  Doctor of Audiology     [facility] 4th Floor, Mail [id number]  [address] [place] [address]  [email or web address]  [phone]  Appointments: [phone]  Fax: [phone]    -----Original Message-----  From: Khai Pruitt <[email or web address]>  Sent: Thursday, April 15, 2021 7:43 AM  To: Carrie Vila [email or web address]>  Subject: Re: RE:    Tino Butler, hope you are doing well. Question for you, is it ok to have a panoramic Dental extras with my implant?  Thank you khai

## 2021-04-24 ENCOUNTER — HEALTH MAINTENANCE LETTER (OUTPATIENT)
Age: 71
End: 2021-04-24

## 2021-10-03 ENCOUNTER — HEALTH MAINTENANCE LETTER (OUTPATIENT)
Age: 71
End: 2021-10-03

## 2022-03-21 ENCOUNTER — TELEPHONE (OUTPATIENT)
Dept: AUDIOLOGY | Facility: CLINIC | Age: 72
End: 2022-03-21
Payer: COMMERCIAL

## 2022-03-21 NOTE — TELEPHONE ENCOUNTER
Letter of medical necessity for N7 cochlear implant sound processor upgrade was written and routed to Dr. Hurst for signature via [employer].  Once signed by Dr. Hurst, it will electronically route to [facility] [place], who will assist with insurance authorization.      Miki Ly, CCC-A, [facility]  Licensed Audiologist  [place] #[id number]

## 2022-04-25 DIAGNOSIS — Z96.21 COCHLEAR IMPLANT IN PLACE: Primary | ICD-10-CM

## 2022-05-02 NOTE — PROGRESS NOTES
AUDIOLOGY REPORT    BACKGROUND: Buffy Pruitt was seen 5/11/2022 in Audiology at the [facility] for right cochlear implant programming and equipment upgrade.  Dr. Heena Hurst implanted Buffy Pruitt with a right  Nucleus  (serial #[id number]) cochlear implant (CI) on 10/5/16 (activation 11/1/16) due to normal hearing sloping to moderate to profound sensorineural hearing loss in the left ear and normal hearing sloping to moderately severe to profound sensorineural hearing loss in the right ear.      PATIENT REPORT: Joanna arrives with her N7 upgrade kit for the right CI.  She has a left Resound hearing aid.      Patient primarily wears program #2 in her N6.        FITTING SESSION: Dr. Heena Hurst, cochlear implant surgeon, ordered today's appointment. The patient came to the clinic for adjustment to the programs in the external speech processor and for assessment of the external components of the cochlear implant system. These components provide power and data to the internal device. Sound is only heard once the external portion is activated. Postoperative treatment, including device fitting and adjustment, audiologic assessments, and training are required at regular intervals. Testing can include electropsychophysical measures of threshold, comfort, and loudness balancing, which are completed to update the program.    Processor type: N7 fit today; Backups: N6  Magnet strength: #1 without irritation of skin      Given #1 from clinic stock (black) since patient was shipped #2; Ordered correct color (sand beige) #1 to ship to patient     TEST RESULTS:   Datalogging: Did not test since today is activation   Electrode Impedances: within normal limits.  Monitoring due to past fluctuations which Cochlear indicates are likely caused by the loss of residual hearing.  Impedances continue to fluctuate today but programs are within compliance.      Neural  "Response Testing: Did not test today since clear responses were found across the array at past visit.  Facial Stimulation: Absent  Tinnitus: Absent  Balance Problems: Absent  Pain/Discomfort: None reported  Strategies Tried:  Hz  Strategy Preference:  Hz    Programs in N7:   1. (30) HOME: Less high frequencies, ADRO + autosensitivity   2. (32) HOME: Older/preferred program, ADRO + autosensitivity  3. (32) SCAN based on program #2    4. (32) CAFE with fixed directional, ADRO + autosensitivity, based on program #2     Programs in N6: Not changed today   1. (29) HOME: Less high frequencies, ADRO + autosensitivity   2. (27) HOME: Older/preferred program, ADRO + autosensitivity  3. (27) SCAN based on program #2    4. (27) CAFE with fixed directional, ADRO + autosensitivity, based on program #2     Number of Channels per Program: 22    COMMENTS: The N6 programs were converted for use in the N7 processor.  Upon going live, comfort (C) levels needed to be globally reduced 2 units.  ForwardFocus access was provided.  The N7 processor was linked with the Resound hearing aid in [employer].      All of the equipment in the N7 processor kit was reviewed including her  remote.  The patient was given time to practice use of the device and placement/removal of processor.  The patient's questions were answered.  Warranties were reviewed.    **NOTE: Due to a \"bug\" in the iOS (per Cochlear), her processor would only intermittently pair to the phone and criselda.  I will follow up with Cochlear to see if there is a timeline for resolution of this issue.  For now, the devices are not paired to the phone and the N7 is not paired to the criselda.      Patient has two N6 processors.  She reports her 2nd one sounds different than her first.  Discussed that she typically wears program #2 and the other processor might be in program #1.  She will check this or can bring to the next visit for support.      SUMMARY AND " RECOMMENDATIONS: Joanna upgraded to the N7 sound processor for her right cochlear implant today.  She will return in 4-6 weeks for follow up and hall testing.  If the iOS issues are resolved by that time, we will pair both devices to her phone and the N7 to the Nucleus Smart criselda.  Patient was given contact information for Cochlear technical support and for Aundrea Sherman, Recipient  at Co[employer], in case support is needed in the meantime.  Patient will be receiving a sand beige #1 magnet at her home in the next few days.     The patient expressed understanding and agreement with this plan.    Miki Ly, CCC-A, [facility]  Licensed Audiologist  [place] #[id number]

## 2022-05-11 ENCOUNTER — OFFICE VISIT (OUTPATIENT)
Dept: AUDIOLOGY | Facility: CLINIC | Age: 72
End: 2022-05-11
Payer: COMMERCIAL

## 2022-05-11 DIAGNOSIS — Z96.21 COCHLEAR IMPLANT IN PLACE: ICD-10-CM

## 2022-05-11 DIAGNOSIS — H90.3 SENSORY HEARING LOSS, BILATERAL: Primary | ICD-10-CM

## 2022-05-15 ENCOUNTER — HEALTH MAINTENANCE LETTER (OUTPATIENT)
Age: 72
End: 2022-05-15

## 2022-06-01 ENCOUNTER — TELEPHONE (OUTPATIENT)
Dept: AUDIOLOGY | Facility: CLINIC | Age: 72
End: 2022-06-01
Payer: COMMERCIAL

## 2022-06-01 NOTE — TELEPHONE ENCOUNTER
Notified by Cochle[facility] that patient's N7 processor [id number] is on the list affected by the recently Apple connectivity issues.  RMA was submitted.  Notified patient.  She can try to connect the new processor or can bring it to her next appointment.       Miki Ly, CCC-A, [facility]  Licensed Audiologist  [place] #[id number]

## 2022-06-02 NOTE — PROGRESS NOTES
AUDIOLOGY REPORT    BACKGROUND: Buffy Pruitt was seen 6/22/2022 in Audiology at the [facility] for right cochlear implant follow up and testing.  Dr. Heena Hurst implanted Buffy Pruitt with a right  Nucleus  (serial #[id number]) cochlear implant (CI) on 10/5/16 (activation 11/1/16) due to normal hearing sloping to moderate to profound sensorineural hearing loss in the left ear and normal hearing sloping to moderately severe to profound sensorineural hearing loss in the right ear.      PATIENT REPORT: Joanna was last here about 6 weeks ago to upgrade to the N7 processor.  Her left Resound hearing aid was linked with the N7 processor at that visit.  At that time, there were issues connecting the devices to her iPhone.  [facility] subsequently announced iPhone connectivity issues that affected a subset of processors and hers was included and was exchanged.  She would like assistance pairing the replacement processor to her phone and the Nucleus Smart criselda today.      Patient has an old mini erica 2+.  I offered to pair this to her N7 and hearing aid but she declined.  She wants to start with streaming and will add mini erica 2+ later, if needed.     One of the  ports on her Y  does not work.  RMA was submitted and replacement  will ship to her home.    Patient received beige magnet and would like assistance putting it into her coil.  This was completed.      Patient arrives wearing N7 volume at 5 in SCAN program.      METHOD: Speech perception testing is conducted at regular intervals to determine the degree of benefit the patient is obtaining from the cochlear implant (CI). Tests are conducted using the cochlear implant without the benefit of lipreading. All tests are conducted in a sound-treated room. Perception of monosyllabic words and words in sentences are tested. Results usually show improvement over time. A decrease in performance  indicates the need for re-programming of the prosthesis and/or replacement of components.     INTERVAL: 5.5 years           TEST RESULTS:  40 minutes were spent assessing the patient s auditory rehabilitation status.    *NOTE: The left ear was plugged for right CI testing.     Device(s) used for Testing:   Right ear: Cochlear [employer] [place] N7 sound processor, SCAN with volume 5. #1 magnet without irritation or discomfort.    Left ear: Resound SHERLYN    Soundfield Aided Thresholds with right CI: Detection in borderline normal to mild loss range - stable    Unaided Thresholds: Left ear was not retested since patient reports this was recently done at her clinic in [place].  Right ear did not test since no measurable hearing on 7/26/18 (profound sensorineural hearing loss).      Tympanograms: Left normal, Right no seal as in past - sharp bend in canal, no otologic symptoms     CNC Words Test:  The patient repeats 25 single syllable words, auditory only. The words are presented at 60 dB A (conversational level) delivered from a CD player.    Preoperative Performance:  Left ear aided: 12%  Right ear aided: 8%  Bilaterally aided: 8%    3 months Post-Activation of CI:   Right CI: 60%  Bimodal: Did not test since patient has not been wearing the left hearing aid.  Will resume use after today's appointment.     7 1/2 months Post-Activation of CI:   Right CI: 72%  Bimodal: 80%    1 year, 3 months Post-Activation of CI:   Right CI: 76%  Bimodal: 80%    1 year, 9 months Post-Activation of CI:   Right CI: 76%  Bimodal: Did not test due to patient fatigue and since tested at last visit.  Patient expects to get new hearing aid soon.      2 years, 3 months Post-Activation of CI:   Right CI: 76%  Bimodal: 88%    3 years Post-Activation of CI:   Right CI: 84%  Bimodal: 84%    5.5 years Post-Activation of CI (today):   Right CI: 84%  Bimodal: Did not test due to high score with CI alone      [employer] Sentences Test:  The patient repeats 20  sentences, auditory only.  The sentences are presented at 60 dB A (conversational level) delivered from a CD player.     Preoperative Performance:  Left ear aided: 44% quiet, 20% +10 dB signal to noise ratio (SNR)  Right ear aided: 38% quiet, 11% +10 dB SNR  Bilaterally aided: 52% quiet, 23% +10 dB SNR    3 months Post-Activation of CI:   Right CI: 89% quiet, 69% +10 dB SNR  Bimodal: Did not test since patient has not been wearing the left hearing aid.  Will resume use after today's appointment.    7 1/2 months Post-Activation of CI:   Right CI: 91% quiet, 65% +10 dB SNR  Bimodal: Did not test in quiet, 78% with +10 dB SNR     1 years, 3 months Post-Activation of CI:   Right CI: 90% quiet, 70% +10 dB SNR  Bimodal: Did not test in quiet, 85% with +10 dB SNR     1 years, 9 months Post-Activation of CI:   Right CI: 91% quiet, 75% +10 dB SNR  Bimodal: Did not test due to patient fatigue and since tested at last visit.  Patient expects to get new hearing aid soon.       2 years, 3 months Post-Activation of CI:   Right CI: 94% quiet, 81% +10 dB SNR  Bimodal: Did not test in quiet; 78% with +10 dB SNR    3 years Post-Activation of CI:   Right CI: 93% quiet, 76% +10 dB SNR  Bimodal: Did not test in quiet; 76% with +10 dB SNR    3 years Post-Activation of CI:   Right CI: 93% quiet, 76% +10 dB SNR  Bimodal: Did not test in quiet; 76% with +10 dB SNR    5.5 years Post-Activation of CI (today):   Right CI: 93% quiet, 75% +10 dB SNR  Bimodal: Did not test in quiet; 84% with +10 dB SNR    COMMENTS: The N7 and hearing aid were paired to the patient's iPhone.  Additionally, the N7 was paired to the Nucleus Smart criselda.  Use of the criselda was discussed in detail.      SUMMARY AND RECOMMENDATIONS: Joanna has been using her right [employer] cochlear implant for 5.5 years and upgraded to the N7 processor last month.  The replacement processor was successfully paired with her phone and Nucleus Smart criselda.   is being exchanged.         Speech perception scores with the right CI are above average and are stable. Patient should return for monitoring of speech perception scores and impedances in 1 year.  She will contact the clinic or [facility] sooner if she later wishes to set up her old mini erica 2+, which she declined setting up today.     Middle ear function is normal in the left ear. Tympanogram could not successfully be recorded in the right ear.  No otologic symptoms as in the past.  She will continue to follow up with Dr. Hurst as needed for concerns about her ears or cochlear implant.      The patient expressed understanding and agreement with this plan.    Miki Ly, CCC-A, [facility]  Licensed Audiologist  [place] #[id number]    Enclosure: audiogram    Cc Heena Hurst MD

## 2022-06-22 ENCOUNTER — OFFICE VISIT (OUTPATIENT)
Dept: AUDIOLOGY | Facility: CLINIC | Age: 72
End: 2022-06-22
Payer: COMMERCIAL

## 2022-06-22 DIAGNOSIS — H90.3 SENSORY HEARING LOSS, BILATERAL: Primary | ICD-10-CM

## 2022-06-22 PROCEDURE — 92626 EVAL AUD FUNCJ 1ST HOUR: CPT | Performed by: AUDIOLOGIST-HEARING AID FITTER

## 2022-06-22 PROCEDURE — 92567 TYMPANOMETRY: CPT | Mod: 52 | Performed by: AUDIOLOGIST-HEARING AID FITTER

## 2022-06-29 ENCOUNTER — TELEPHONE (OUTPATIENT)
Dept: AUDIOLOGY | Facility: CLINIC | Age: 72
End: 2022-06-29

## 2022-09-10 ENCOUNTER — HEALTH MAINTENANCE LETTER (OUTPATIENT)
Age: 72
End: 2022-09-10

## 2022-11-03 ENCOUNTER — TELEPHONE (OUTPATIENT)
Dept: AUDIOLOGY | Facility: CLINIC | Age: 72
End: 2022-11-03

## 2022-11-03 NOTE — TELEPHONE ENCOUNTER
Received email from patient. See below.  Forwarded questions to Dr. Heena Hurst and her nurses to follow up with the patient with recommendations.        Email from patient:   Tino Butler, I have been looking into this,and have contacted [facility] to see about  The procedure and if there was any protection for the ear.This was their reply.  Is it possible to check with Dr. Hurst and the anesthesiologist about this?  I am scheduled for this Nov.23. Thank you, Joanna

## 2022-11-03 NOTE — TELEPHONE ENCOUNTER
"Sent Dr. Hurst' response to the patient via email and provided Dr. Hurst' clinic call back # [phone].      Miki Ly, CCC-A, [facility]  Licensed Audiologist  [place] #[id number]        Per Dr. Hurst:   \"The physician she is seeing will be able to determine if the procedure is the same as using a bipolar as [facility] mentioned, as bipolar is safe.  That information in the email can be shared with them.   If it is more like a monopolar or the current spreads widely, it may damage the CI.  Thus, the treating physician will be able to decide if they wish to proceed based on their understanding of the RF ablation device/methods they are using.   If they wish to speak with Dr. Hurst, they are welcome to do so, but they will ultimately have to make the decision based on their knowledge of how the RF device works.\"      "

## 2023-01-22 ENCOUNTER — HEALTH MAINTENANCE LETTER (OUTPATIENT)
Age: 73
End: 2023-01-22

## 2023-02-06 NOTE — PROGRESS NOTES
AUDIOLOGY REPORT    BACKGROUND: Buffy Pruitt was seen 2023 in Audiology at the [facility] for right cochlear implant follow up and assistance.  Dr. 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      PATIENT REPORT: Joanna reports that her left Resound hearing aid was recently replaced.  She needs assistance linking the replacement hearing aid with her cochlear implant.      FITTING SESSION: Dr. Heena Hurst, cochlear implant surgeon, ordered today's appointment. The patient came to the clinic for adjustment to the programs in the external speech processor and for assessment of the external components of the cochlear implant system. These components provide power and data to the internal device. Sound is only heard once the external portion is activated. Postoperative treatment, including device fitting and adjustment, audiologic assessments, and training are required at regular intervals. Testing can include electropsychophysical measures of threshold, comfort, and loudness balancing, which are completed to update the program.    Processor type: N7; Backups: N6  Magnet strength: #1 without irritation of skin        TEST RESULTS:   Dataloggin hours of use per day   Electrode Impedances: within normal limits.  Monitoring due to past fluctuations. Electrode #13 anant slightly but compliance was maintained.      Neural Response Testing: Did not test today since clear responses were found across the array at past visit.  Facial Stimulation: Absent  Tinnitus: Absent  Balance Problems: Absent  Pain/Discomfort: None reported  Strategies Tried:   Hz  Strategy Preference:  Hz    Programs in N7:   1. (30) HOME: Less high frequencies, ADRO + autosensitivity   2. (32) HOME: Older/preferred program, ADRO + autosensitivity  3. (32) SCAN based on program #2    4. (32) CAFE with fixed directional, ADRO + autosensitivity, based on program #2     Programs in N6: Not changed today   1. (29) HOME: Less high frequencies, ADRO + autosensitivity   2. (27) HOME: Older/preferred program, ADRO + autosensitivity  3. (27) SCAN based on program #2    4. (27) CAFE with fixed directional, ADRO + autosensitivity, based on program #2     Number of Channels per Program: 22    COMMENTS: The N7 processor and the hearing aid were linked in [employer] and programs were re-downloaded.      Both devices were then re-connected to the patient's cell phone.  N7 remained paired to Nucleus Smart criselda.    Patient wanted an criselda for her hearing aid.  We tried downloading the Resound Smart 3D criselda but the patient did not remember her Apple ID to complete the downloading on her iPhone. The name of the criselda was written down so she could download it at home after looking up her password.      Patient reports she will be getting a TV streamer from her hearing aid clinic.  We discussed that this will work bimodally and she was given pairing instructions.      SUMMARY AND RECOMMENDATIONS: Joanna was seen to link her new left hearing aid with her right cochlear implant in Custom [employer], and to link both devices to her iPhone.  Since no other changes were needed, programming charges were modified.  The patient should schedule a return visit in late June 2023 for annual testing.      The patient expressed understanding and agreement with this plan.    Miki Ly, CCC-A, [facility]  Licensed Audiologist  [place] #[id number]

## 2023-02-13 ENCOUNTER — OFFICE VISIT (OUTPATIENT)
Dept: AUDIOLOGY | Facility: CLINIC | Age: 73
End: 2023-02-13
Payer: COMMERCIAL

## 2023-02-13 DIAGNOSIS — H90.3 SENSORY HEARING LOSS, BILATERAL: Primary | ICD-10-CM

## 2023-06-03 ENCOUNTER — HEALTH MAINTENANCE LETTER (OUTPATIENT)
Age: 73
End: 2023-06-03

## 2024-04-30 ENCOUNTER — TELEPHONE (OUTPATIENT)
Dept: AUDIOLOGY | Facility: CLINIC | Age: 74
End: 2024-04-30
Payer: COMMERCIAL

## 2024-04-30 NOTE — TELEPHONE ENCOUNTER
Patient contacted clinic asking if she could have an MRI with her cochlear implant.  Reviewed with Dr. Heena Hurst (cochlear implant surgeon) and [facility].  Given new imaging guidelines, 1.5 T MRI can be done with [employer]  cochlear implant with use of MRI kit. Magnet removal is needed for 3T MRI.    Dr. Hurst indicated she would be comfortable with patient having MRI local to her home if they have the guidelines or if [facility] educates that imaging center.  I contacted patient.  She indicated she was not yet sure where the imaging would be completed.  She was advised to contact us when she knows this so the imaging center and [facility] can be put into contact with one another to ensure imaging is done safely.     Patient sent update today that they are no longer planning MRI.  If something changes, she will contact the clinic.     For future reference, MRI guidance can be found below.     [facility] MRI guidelines: [email or web address]-guidelines    [facility] MRI information line:  [phone]      Miki Ly, CCC-A, [facility]  Licensed Audiologist  [place] #[id number]

## 2024-05-08 NOTE — TELEPHONE ENCOUNTER
"Received updated email communication from patient (see below), which I passed along to Dr. Hurst' clinic for planning and so they can connect with patient.  Message from patient:    \"Morning Carrie, had my doctor appointment yesterday.He would like an MRI   and suggested to have it done at [facility] where I had the implant done. He is sending   a referral with the attention of audiology department .Being not sure if it can be   done until  is consulted maybe just do a cat scan?If the MRI is really involved?  Just wondering have they done a few MRI's  with implants?  Just wanted to let you know what the latest is. Thank you so much for your input   Carrie,really appreciate it.\"              Miki Ly, CCC-A, [facility]  Licensed Audiologist  [place] #[id number]      "

## 2024-05-09 NOTE — TELEPHONE ENCOUNTER
Email communication to patient:     Hi Joanna,     We can see the MRI order in the system.  In order to schedule the MRI, either you or your referring clinic need to call: [phone].  Choose Option 2,  then Option 1.   Tell them location: [facility]   The  can note details for imaging team, so I'd suggest noting:  Right [employer]  cochlear implant. MRI team should follow guidelines from [facility]     I have connected with [facility] and they are going to reach out to the imaging center here to make sure they are ready for your appointment and that they have the required CI MRI kit available.  Once we know your MRI date, please let me know and I'll update [facility].     Please let us know if questions.       Miki Ly, CCC-A, [facility]  Doctor of Audiology     [facility]  Audiology Clinic  4th Floor, Mail [address]  [address] [place] [address]  [email or web address]  [phone]  Appointments: [phone]  Fax: [phone]

## 2024-05-14 NOTE — TELEPHONE ENCOUNTER
Received email update from patient.  She has decided that she is not comfortable proceeding with an MRI due to her cochlear implant so she is planning to have CT instead.  She does not need any additional follow up from our clinic at this time.    Miki Ly, CCC-A, [facility]  Licensed Audiologist  [place] #[id number]

## 2024-07-07 ENCOUNTER — HEALTH MAINTENANCE LETTER (OUTPATIENT)
Age: 74
End: 2024-07-07

## 2025-02-09 ENCOUNTER — HEALTH MAINTENANCE LETTER (OUTPATIENT)
Age: 75
End: 2025-02-09

## 2025-04-03 ENCOUNTER — TELEPHONE (OUTPATIENT)
Dept: AUDIOLOGY | Facility: CLINIC | Age: 75
End: 2025-04-03
Payer: COMMERCIAL

## 2025-04-03 NOTE — TELEPHONE ENCOUNTER
Email communication with patient:     Tino Marshall,  Have you contacted [facility] Technical Support?  If not, I'd start there: [phone]  Hopefully it is something they can help resolve. If equipment needs to be exchanged, they can assist with that.  You got the N7 in May 2022 so should still be in warranty if exchanges are needed.      Most insurances won't cover upgrade if the equipment isn't 5 years old yet.  Generally they also need to be out of warranty, malfunctioning, and obsolete. N7 will be obsolete in late Jan 2026.   So I don't think we'd be looking at N8 yet, just troubleshooting the issue at hand with N7.     Please keep me posted.     Thanks,     Miki Ly, CCC-A, [facility]  Doctor of Audiology     [facility]  Audiology Clinic  4th Floor, Mail [address]  [address] [place] [address]  [email or web address]  [phone]  Appointments: [phone]  Fax: [phone]    From: Khai Pruitt <[email or web address]>   Sent: Thursday, April 3, 2025 8:11 AM  To: Carrie Vila <[email or web address]>  Subject: CI    Good Morning, khai Pruitt  here.  Having an issue with the streaming clicking on an off.  Did trouble shoot it and tried the suggestions but hasn't helped. We do have an  hearing person in [facility] that services CI's her name is Regine Melendrez. Do you think I would have to do the upgrade to 8? My current one is not at the 5 year leyla yet.  Thank you for your time Khai Butler  Sent from my iPad

## 2025-07-13 ENCOUNTER — HEALTH MAINTENANCE LETTER (OUTPATIENT)
Age: 75
End: 2025-07-13